# Patient Record
Sex: FEMALE | Race: WHITE | NOT HISPANIC OR LATINO | Employment: UNEMPLOYED | URBAN - METROPOLITAN AREA
[De-identification: names, ages, dates, MRNs, and addresses within clinical notes are randomized per-mention and may not be internally consistent; named-entity substitution may affect disease eponyms.]

---

## 2020-06-30 ENCOUNTER — NURSE TRIAGE (OUTPATIENT)
Dept: ADMINISTRATIVE | Facility: CLINIC | Age: 50
End: 2020-06-30

## 2022-01-21 ENCOUNTER — HOSPITAL ENCOUNTER (EMERGENCY)
Facility: HOSPITAL | Age: 52
Discharge: HOME | End: 2022-01-21
Attending: EMERGENCY MEDICINE
Payer: MEDICAID

## 2022-01-21 VITALS
RESPIRATION RATE: 16 BRPM | OXYGEN SATURATION: 100 % | HEART RATE: 77 BPM | WEIGHT: 168 LBS | TEMPERATURE: 98 F | HEIGHT: 65 IN | BODY MASS INDEX: 27.99 KG/M2 | SYSTOLIC BLOOD PRESSURE: 127 MMHG | DIASTOLIC BLOOD PRESSURE: 80 MMHG

## 2022-01-21 DIAGNOSIS — Z76.0 MEDICATION REFILL: Primary | ICD-10-CM

## 2022-01-21 DIAGNOSIS — R10.2 VAGINAL PAIN: ICD-10-CM

## 2022-01-21 DIAGNOSIS — N93.9 VAGINAL SPOTTING: ICD-10-CM

## 2022-01-21 LAB
ALBUMIN SERPL BCP-MCNC: 3.5 G/DL (ref 3.5–5.2)
ALP SERPL-CCNC: 82 U/L (ref 55–135)
ALT SERPL W/O P-5'-P-CCNC: 7 U/L (ref 10–44)
ANION GAP SERPL CALC-SCNC: 6 MMOL/L (ref 8–16)
AST SERPL-CCNC: 11 U/L (ref 10–40)
B-HCG UR QL: NEGATIVE
BACTERIA GENITAL QL WET PREP: ABNORMAL
BASOPHILS # BLD AUTO: 0.05 K/UL (ref 0–0.2)
BASOPHILS NFR BLD: 0.5 % (ref 0–1.9)
BILIRUB SERPL-MCNC: 0.4 MG/DL (ref 0.1–1)
BILIRUB UR QL STRIP: NEGATIVE
BUN SERPL-MCNC: 9 MG/DL (ref 6–20)
CALCIUM SERPL-MCNC: 8.8 MG/DL (ref 8.7–10.5)
CHLORIDE SERPL-SCNC: 104 MMOL/L (ref 95–110)
CLARITY UR: CLEAR
CLUE CELLS VAG QL WET PREP: ABNORMAL
CO2 SERPL-SCNC: 30 MMOL/L (ref 23–29)
COLOR UR: YELLOW
CREAT SERPL-MCNC: 0.9 MG/DL (ref 0.5–1.4)
CTP QC/QA: YES
DIFFERENTIAL METHOD: ABNORMAL
EOSINOPHIL # BLD AUTO: 0.1 K/UL (ref 0–0.5)
EOSINOPHIL NFR BLD: 1 % (ref 0–8)
ERYTHROCYTE [DISTWIDTH] IN BLOOD BY AUTOMATED COUNT: 12.7 % (ref 11.5–14.5)
EST. GFR  (AFRICAN AMERICAN): >60 ML/MIN/1.73 M^2
EST. GFR  (NON AFRICAN AMERICAN): >60 ML/MIN/1.73 M^2
FILAMENT FUNGI VAG WET PREP-#/AREA: ABNORMAL
GLUCOSE SERPL-MCNC: 91 MG/DL (ref 70–110)
GLUCOSE UR QL STRIP: NEGATIVE
HCT VFR BLD AUTO: 43.5 % (ref 37–48.5)
HGB BLD-MCNC: 14.3 G/DL (ref 12–16)
HGB UR QL STRIP: NEGATIVE
IMM GRANULOCYTES # BLD AUTO: 0.03 K/UL (ref 0–0.04)
IMM GRANULOCYTES NFR BLD AUTO: 0.3 % (ref 0–0.5)
KETONES UR QL STRIP: NEGATIVE
LEUKOCYTE ESTERASE UR QL STRIP: NEGATIVE
LYMPHOCYTES # BLD AUTO: 2.1 K/UL (ref 1–4.8)
LYMPHOCYTES NFR BLD: 22.3 % (ref 18–48)
MCH RBC QN AUTO: 32.2 PG (ref 27–31)
MCHC RBC AUTO-ENTMCNC: 32.9 G/DL (ref 32–36)
MCV RBC AUTO: 98 FL (ref 82–98)
MONOCYTES # BLD AUTO: 0.8 K/UL (ref 0.3–1)
MONOCYTES NFR BLD: 8.1 % (ref 4–15)
NEUTROPHILS # BLD AUTO: 6.4 K/UL (ref 1.8–7.7)
NEUTROPHILS NFR BLD: 67.8 % (ref 38–73)
NITRITE UR QL STRIP: NEGATIVE
NRBC BLD-RTO: 0 /100 WBC
PH UR STRIP: 8 [PH] (ref 5–8)
PLATELET # BLD AUTO: 311 K/UL (ref 150–450)
PMV BLD AUTO: 9.2 FL (ref 9.2–12.9)
POTASSIUM SERPL-SCNC: 4.3 MMOL/L (ref 3.5–5.1)
PROT SERPL-MCNC: 6.5 G/DL (ref 6–8.4)
PROT UR QL STRIP: NEGATIVE
RBC # BLD AUTO: 4.44 M/UL (ref 4–5.4)
SODIUM SERPL-SCNC: 140 MMOL/L (ref 136–145)
SP GR UR STRIP: 1.02 (ref 1–1.03)
SPECIMEN SOURCE: ABNORMAL
T VAGINALIS GENITAL QL WET PREP: ABNORMAL
TSH SERPL DL<=0.005 MIU/L-ACNC: 0.98 UIU/ML (ref 0.4–4)
URN SPEC COLLECT METH UR: NORMAL
UROBILINOGEN UR STRIP-ACNC: 1 EU/DL
WBC # BLD AUTO: 9.47 K/UL (ref 3.9–12.7)
WBC #/AREA VAG WET PREP: ABNORMAL
YEAST GENITAL QL WET PREP: ABNORMAL

## 2022-01-21 PROCEDURE — 87491 CHLMYD TRACH DNA AMP PROBE: CPT | Performed by: EMERGENCY MEDICINE

## 2022-01-21 PROCEDURE — 84443 ASSAY THYROID STIM HORMONE: CPT | Performed by: EMERGENCY MEDICINE

## 2022-01-21 PROCEDURE — 87210 SMEAR WET MOUNT SALINE/INK: CPT | Performed by: EMERGENCY MEDICINE

## 2022-01-21 PROCEDURE — 99284 EMERGENCY DEPT VISIT MOD MDM: CPT

## 2022-01-21 PROCEDURE — 36415 COLL VENOUS BLD VENIPUNCTURE: CPT | Performed by: EMERGENCY MEDICINE

## 2022-01-21 PROCEDURE — 81025 URINE PREGNANCY TEST: CPT | Performed by: EMERGENCY MEDICINE

## 2022-01-21 PROCEDURE — 80053 COMPREHEN METABOLIC PANEL: CPT | Performed by: EMERGENCY MEDICINE

## 2022-01-21 PROCEDURE — 25000003 PHARM REV CODE 250: Performed by: EMERGENCY MEDICINE

## 2022-01-21 PROCEDURE — 81003 URINALYSIS AUTO W/O SCOPE: CPT | Performed by: EMERGENCY MEDICINE

## 2022-01-21 PROCEDURE — 85025 COMPLETE CBC W/AUTO DIFF WBC: CPT | Performed by: EMERGENCY MEDICINE

## 2022-01-21 PROCEDURE — 87591 N.GONORRHOEAE DNA AMP PROB: CPT | Performed by: EMERGENCY MEDICINE

## 2022-01-21 RX ORDER — VENLAFAXINE 75 MG/1
75 TABLET ORAL DAILY
Qty: 30 TABLET | Refills: 0 | Status: SHIPPED | OUTPATIENT
Start: 2022-01-21

## 2022-01-21 RX ORDER — GABAPENTIN 800 MG/1
800 TABLET ORAL 2 TIMES DAILY
COMMUNITY
End: 2023-02-05

## 2022-01-21 RX ORDER — VENLAFAXINE 75 MG/1
75 TABLET ORAL 2 TIMES DAILY
COMMUNITY
End: 2022-01-21 | Stop reason: SDUPTHER

## 2022-01-21 RX ORDER — ARIPIPRAZOLE 5 MG/1
5 TABLET ORAL DAILY
COMMUNITY
End: 2022-01-21 | Stop reason: SDUPTHER

## 2022-01-21 RX ORDER — ARIPIPRAZOLE 5 MG/1
5 TABLET ORAL DAILY
Qty: 30 TABLET | Refills: 0 | Status: SHIPPED | OUTPATIENT
Start: 2022-01-21 | End: 2023-08-23 | Stop reason: SDUPTHER

## 2022-01-21 RX ADMIN — LIDOCAINE HYDROCHLORIDE: 20 SOLUTION ORAL; TOPICAL at 09:01

## 2022-01-22 NOTE — ED PROVIDER NOTES
Encounter Date: 2022       History     Chief Complaint   Patient presents with    Emesis     Sleeve done 1.5 years ago.     Vaginal Bleeding     Started 9 days ago. LMP approx 2021. Patient blood when wiping.      51-year-old female with a history of depression and anxiety presents to the ER with several complaints.  Patient is complaining of several weeks of vomiting once a day.  Sleeve gastroplasty done 1-1/2 years ago.  She is also complaining of some vaginal spotting.  She has not missed any menstrual cycles and is not pregnant.  She denies abdominal pain.  She denies diarrhea.  She is out of her FX or and Abilify for about a week.  She is requesting refills of these medications.  She has no chest pain she has no shortness of breath.  She has no dysuria.  Patient states she feels vaginal discomfort and thinks she has a vaginal lump.  She reports that she just does not feel right.  No drugs no alcohol.    The history is provided by the patient.     Review of patient's allergies indicates:  No Known Allergies  Past Medical History:   Diagnosis Date    Anxiety     Depression      Past Surgical History:   Procedure Laterality Date     SECTION      cyst on tailbone      SLEEVE GASTROPLASTY       History reviewed. No pertinent family history.  Social History     Tobacco Use    Smoking status: Current Every Day Smoker     Packs/day: 0.50     Years: 25.00     Pack years: 12.50    Smokeless tobacco: Never Used   Substance Use Topics    Alcohol use: Not Currently    Drug use: Never     Review of Systems   Constitutional: Positive for fatigue.   Gastrointestinal: Positive for nausea and vomiting. Negative for abdominal pain.   Genitourinary: Positive for vaginal bleeding and vaginal pain.   All other systems reviewed and are negative.      Physical Exam     Initial Vitals [22 1913]   BP Pulse Resp Temp SpO2   127/80 77 16 98.2 °F (36.8 °C) 100 %      MAP       --         Physical  Exam    Nursing note and vitals reviewed.  Constitutional: She appears well-developed and well-nourished. She is not diaphoretic. No distress.   HENT:   Head: Normocephalic and atraumatic.   Eyes: EOM are normal.   Neck: Neck supple.   Normal range of motion.  Cardiovascular: Normal rate, regular rhythm and normal heart sounds. Exam reveals no gallop and no friction rub.    No murmur heard.  Pulmonary/Chest: Breath sounds normal. No respiratory distress. She has no wheezes. She has no rhonchi. She has no rales.   Abdominal: Abdomen is soft. She exhibits no distension. There is no abdominal tenderness. There is no rebound.   Genitourinary:    Genitourinary Comments: Pelvic exam performed with [facility] nursing chaperone.  She has some yellow vaginal discharge.  Cervix appears unremarkable.  There is small area of prominent tender tissue in the vaginal floor.  No evidence of herpes.  Needs OBGYN follow-up.     Musculoskeletal:         General: Normal range of motion.      Cervical back: Normal range of motion and neck supple.     Neurological: She is alert and oriented to person, place, and time.   Skin: Skin is warm and dry.   Psychiatric: She has a normal mood and affect. Her behavior is normal. Judgment and thought content normal.         ED Course   Procedures  Labs Reviewed   CBC W/ AUTO DIFFERENTIAL - Abnormal; Notable for the following components:       Result Value    MCH 32.2 (*)     All other components within normal limits   COMPREHENSIVE METABOLIC PANEL - Abnormal; Notable for the following components:    CO2 30 (*)     ALT 7 (*)     Anion Gap 6 (*)     All other components within normal limits   VAGINAL SCREEN - Abnormal; Notable for the following components:    WBC - Vaginal Screen Few (*)     Bacteria - Vaginal Screen Occasional (*)     All other components within normal limits    Narrative:     Release to patient->Immediate   C. TRACHOMATIS/N. GONORRHOEAE BY AMP DNA   TSH   URINALYSIS, REFLEX TO  URINE CULTURE    Narrative:     Specimen Source->Urine   POCT URINE PREGNANCY          Imaging Results    None          Medications   (pyxis) gi cocktail (mylanta 30 mL, LIDOcaine 2 % viscous 10 mL, dicyclomine 10 mL) 50 mL ( Oral Given 1/21/22 2136)     Medical Decision Making:   Clinical Tests:   Lab Tests: Ordered and Reviewed             ED Course as of 01/21/22 2142 Fri Jan 21, 2022 1950 SpO2: 100 % [EF]   1951 Resp: 16 [EF]   1951 Pulse: 77 [EF]   1951 Temp src: Oral [EF]   1951 Temp: 98.2 °F (36.8 °C) [EF]   1951 BP: 127/80 [EF]   2030 Preg Test, Ur: Negative [EF]   2030 WBC: 9.47 [EF]   2030 Hemoglobin: 14.3 [EF]   2030 Platelets: 311 [EF]   2041 WBC - Vaginal Screen(!): Few [EF]   2041 Bacteria - Vaginal Screen(!): Occasional [EF]   2041 Trichomonas: None [EF]   2041 Clue Cells, Wet Prep: None [EF]   2041 Budding Yeast: None [EF]   2139 51-year-old female presents to the ER for malaise, intermittent vomiting for several weeks and vaginal pain with some spotting.  Pelvic exam demonstrates some discharge with small area of elevated irritated tissue on the floor of the vagina.  Patient is to follow-up with OBGYN for this.  ER workup is unremarkable.  No indication for abdominal CT.  No abdominal tenderness on exam.  Labs are unremarkable.  She needs a refill of her Abilify and venlafaxine which was provided. [EF]      ED Course User Index  [EF] Marcel Chavez MD             Clinical Impression:   Final diagnoses:  [Z76.0] Medication refill (Primary)  [N93.9] Vaginal spotting  [R10.2] Vaginal pain          ED Disposition Condition    Discharge Stable        ED Prescriptions     Medication Sig Dispense Start Date End Date Auth. Provider    ARIPiprazole (ABILIFY) 5 MG Tab Take 1 tablet (5 mg total) by mouth once daily. 30 tablet 1/21/2022 2/20/2022 Marcel Chavez MD    venlafaxine (EFFEXOR) 75 MG tablet Take 1 tablet (75 mg total) by mouth once daily. 30 tablet 1/21/2022  Marcel Chavez MD         Follow-up Information     Follow up With Specialties Details Why Contact Info    [facility] Emergency Dept Emergency Medicine  As needed [address] [place] [id number] [phone]    Ramez Guadalupe MD Obstetrics, Obstetrics and Gynecology Schedule an appointment as soon as possible for a visit   [address] [facility] [address] [facility] [address]  [phone]             Marcel Chavez MD  01/21/22 0345

## 2022-01-22 NOTE — ED NOTES
Patient to ED c/o vaginal bleeding x 9 days. Patient states her LMP was 12/18/21. Pt denies any trauma or the possibility of being pregnant. AAOx4, VSS

## 2022-01-24 LAB
C TRACH DNA SPEC QL NAA+PROBE: NOT DETECTED
N GONORRHOEA DNA SPEC QL NAA+PROBE: NOT DETECTED

## 2022-02-01 ENCOUNTER — TELEPHONE (OUTPATIENT)
Dept: SMOKING CESSATION | Facility: CLINIC | Age: 52
End: 2022-02-01
Payer: MEDICAID

## 2022-02-01 NOTE — TELEPHONE ENCOUNTER
Left message with my name and telephone number [phone] about missed appointment to start the tobacco cessation program.

## 2023-02-05 ENCOUNTER — HOSPITAL ENCOUNTER (EMERGENCY)
Facility: HOSPITAL | Age: 53
Discharge: HOME | End: 2023-02-05
Attending: EMERGENCY MEDICINE
Payer: MEDICAID

## 2023-02-05 VITALS
HEIGHT: 65 IN | BODY MASS INDEX: 28.32 KG/M2 | RESPIRATION RATE: 20 BRPM | HEART RATE: 89 BPM | WEIGHT: 170 LBS | SYSTOLIC BLOOD PRESSURE: 107 MMHG | DIASTOLIC BLOOD PRESSURE: 80 MMHG | TEMPERATURE: 98 F | OXYGEN SATURATION: 99 %

## 2023-02-05 DIAGNOSIS — Z76.0 MEDICATION REFILL: Primary | ICD-10-CM

## 2023-02-05 PROCEDURE — 99282 EMERGENCY DEPT VISIT SF MDM: CPT

## 2023-02-05 RX ORDER — GABAPENTIN 600 MG/1
600 TABLET ORAL 3 TIMES DAILY
Qty: 90 TABLET | Refills: 0 | Status: SHIPPED | OUTPATIENT
Start: 2023-02-05 | End: 2024-02-05

## 2023-02-05 NOTE — ED PROVIDER NOTES
Encounter Date: 2023       History     Chief Complaint   Patient presents with    Medication Refill     Pt wants a gabapentin medication refill.      Patient is a 53-year-old female with a past medical history of chronic anxiety and depression and restless legs syndrome who has been on gabapentin for years and just came back from [place] who presents the emergency room requesting a refill on her gabapentin.  Her last prescription was filled .  This was for 90 gabapentin 600 mg tablets.  She has no other complaints at this time.  She denies any weakness or numbness.    Review of patient's allergies indicates:  No Known Allergies  Past Medical History:   Diagnosis Date    Anxiety     Depression      Past Surgical History:   Procedure Laterality Date     SECTION      cyst on tailbone      SLEEVE GASTROPLASTY       No family history on file.  Social History     Tobacco Use    Smoking status: Every Day     Packs/day: 0.50     Years: 25.00     Pack years: 12.50     Types: Cigarettes    Smokeless tobacco: Never   Substance Use Topics    Alcohol use: Not Currently    Drug use: Never     Review of Systems   Constitutional:  Negative for fever.   HENT:  Negative for ear pain, rhinorrhea and sore throat.    Eyes:  Negative for pain and visual disturbance.   Respiratory:  Negative for cough and shortness of breath.    Cardiovascular:  Negative for chest pain.   Gastrointestinal:  Negative for abdominal pain, diarrhea, nausea and vomiting.   Genitourinary:  Negative for difficulty urinating.   Musculoskeletal:  Negative for arthralgias.   Skin:  Negative for rash.   Neurological:  Negative for weakness, numbness and headaches.        Neuropathy   All other systems reviewed and are negative.    Physical Exam     Initial Vitals [23 1408]   BP Pulse Resp Temp SpO2   107/80 89 20 98 °F (36.7 °C) 99 %      MAP       --         Physical Exam    Nursing note and vitals reviewed.  Constitutional: She  appears well-developed.  Non-toxic appearance.   HENT:   Head: Normocephalic and atraumatic.   Eyes: EOM are normal. Pupils are equal, round, and reactive to light.   Neck: Neck supple.   Cardiovascular:  Normal rate, regular rhythm, normal heart sounds and intact distal pulses.     Exam reveals no gallop and no friction rub.       No murmur heard.  Pulmonary/Chest: Breath sounds normal. No respiratory distress. She has no decreased breath sounds. She has no wheezes. She has no rhonchi. She has no rales.   Abdominal: Abdomen is soft. Bowel sounds are normal. She exhibits no distension. There is no abdominal tenderness.   Musculoskeletal:         General: No edema. Normal range of motion.      Cervical back: Neck supple.     Neurological: She is alert and oriented to person, place, and time. She has normal strength. No sensory deficit. GCS score is 15. GCS eye subscore is 4. GCS verbal subscore is 5. GCS motor subscore is 6.   Skin: Skin is warm and dry. No rash noted.   Psychiatric: She has a normal mood and affect.       ED Course   Procedures  Labs Reviewed - No data to display       Imaging Results    None          Medications - No data to display  Medical Decision Making:   Initial Assessment:   Medication refill  Differential Diagnosis:   Seems to be a simple medication refill.  I do not think she is seeking opiates.  She is not appear to be suicidal homicidal.  This is for her chronic peripheral neuropathy.  I reviewed the  which is consistent that she needs a refill.  Refill given follow up with primary care.  Stable for discharge                        Clinical Impression:   Final diagnoses:  [Z76.0] Medication refill (Primary)        ED Disposition Condition    Discharge Stable          ED Prescriptions       Medication Sig Dispense Start Date End Date Auth. Provider    gabapentin (NEURONTIN) 600 MG tablet Take 1 tablet (600 mg total) by mouth 3 (three) times daily. 90 tablet 2/5/2023 2/5/2024 Omi KLEIN  MD Jamil          Follow-up Information       Follow up With Specialties Details Why Contact Info    [facility]  Schedule an appointment as soon as possible for a visit  Call to schedule follow-up appointment to get medications refilled [facility] [place] [address]  [phone]          Call to schedule follow-up appoint with your regular doctor.  If you do not have a regular doctor call the clinic below             Omi Negron MD  02/05/23 3596

## 2023-03-28 ENCOUNTER — TELEPHONE (OUTPATIENT)
Dept: SURGERY | Facility: CLINIC | Age: 53
End: 2023-03-28
Payer: MEDICAID

## 2023-03-28 DIAGNOSIS — Z12.11 SCREENING FOR COLON CANCER: Primary | ICD-10-CM

## 2023-03-28 DIAGNOSIS — Z12.11 COLON CANCER SCREENING: Primary | ICD-10-CM

## 2023-03-28 RX ORDER — SODIUM CHLORIDE 0.9 % (FLUSH) 0.9 %
3 SYRINGE (ML) INJECTION
Status: CANCELLED | OUTPATIENT
Start: 2023-03-28

## 2023-03-28 NOTE — TELEPHONE ENCOUNTER
Called patient in reference to a referral to Colorectal Surgery for colon cancer screening. Patient verbally consented to a Colonoscopy and requested to be scheduled for a Colonoscopy on 04/03/2023 Patient was advised a designated  is required on the day of the Colonoscopy to drive the patient home and the  must be at least. 18 years old.Colonoscopy Prep instructions were thoroughly explained and discussed with the patient.It was emphasized, and reiterated to the patient, to please not to follow the bowel prep instructions that comes with the bowel prep package.However, to please follow the prep instructions that will be received in the mail,or via the [employer] portal, or by both modes of delivery, which ever method of delivery the patient prefers,from the [facility] L[facility]   Patient acknowledges understanding Prep instructions as explained and discussed on the phone.. Patient was advised the Colonoscopy Prep instructions discussed and explained on the phone,are being mailed out to the patient's verified address on file,or put onto the [employer] portal,or both methods of delivery, whichever the patient prefers. Patient's address on file was verified with the patient for accuracy of mailing. Patient's medications on file was reviewed with the patient for accuracy of information. Patient denies taking  any other medications other than those listed and verified on medication profile.Patient was explained the Colonoscopy will be performed here at [facility]. Patient was further explained the Pre-Op will call one day prior to the procedure date, to discuss Pre-Op instructions;and what time to report for the Colonoscopy. The patient was given the opportunity to ask any questions about the Colonoscopy. No further issues were discussed.

## 2023-03-28 NOTE — TELEPHONE ENCOUNTER
Called patient in reference to a referral to Colorectal Surgery for colon cancer screening. Patient verbally consented to a Colonoscopy and requested to be scheduled for a Colonoscopy on 04/03/2023 Patient was advised a designated  is required on the day of the Colonoscopy to drive the patient home and the  must be at least. 18 years old.Colonoscopy Prep instructions were thoroughly explained and discussed with the patient.It was emphasized, and reiterated to the patient, to please not to follow the bowel prep instructions that comes with the bowel prep package.However, to please follow the prep instructions that will be received in the mail,or via the [employer] portal, or by both modes of delivery, which ever method of delivery the patient prefers,from the [facility]   Patient acknowledges understanding Prep instructions as explained and discussed on the phone.. Patient was advised the Colonoscopy Prep instructions discussed and explained on the phone,are being mailed out to the patient's verified address on file,or put onto the [employer] portal,or both methods of delivery, whichever the patient prefers. Patient's address on file was verified with the patient for accuracy of mailing. Patient's medications on file was reviewed with the patient for accuracy of information. Patient denies taking  any other medications other than those listed and verified on medication profile.Patient was explained the Colonoscopy will be performed here at [facility]. Patient was further explained the Pre-Op will call one day prior to the procedure date, to discuss Pre-Op instructions;and what time to report for the Colonoscopy. The patient was given the opportunity to ask any questions about the Colonoscopy. No further issues were discussed.

## 2023-03-28 NOTE — TELEPHONE ENCOUNTER
The patient has been advised the Colonoscopy Prep Kit will be ordered from the patient's verified preferred pharmacy on file. The medication can  be picked up by the patient, or the patient's designated representative.The patient was further explained the Colonoscopy Prep instructions will be mailed to the patient verified mailing address on file, or put onto the [employer] portal, whichever method of delivery the patient prefers.Additionally this patient was informed,not to follow the instructions that comes with the bowel prep medication. However, the patient was instructed to please follow the Colonoscopy Bowel Prep instructions that's being provided by the . The patient was asked to please to follow the Colonoscopy Prep instructions being provided as precisely,and  meticulously as possible.The patient was advised you  will receive a follow up phone call to summarize the Colonoscopy Prep instructions prior to the scheduled Colonoscopy procedure date. At this time the patient will be given an opportunity to ask any questions regarding the Colonoscopy procedure, and it's associated Bowel Prep instructions.

## 2023-03-29 RX ORDER — SODIUM PICOSULFATE, MAGNESIUM OXIDE, AND ANHYDROUS CITRIC ACID 10; 3.5; 12 MG/160ML; G/160ML; G/160ML
160 LIQUID ORAL
Qty: 320 ML | Refills: 0 | Status: SHIPPED | OUTPATIENT
Start: 2023-03-29

## 2023-04-10 NOTE — DISCHARGE INSTRUCTIONS
See obgyn for vaginal pain please.  Follow-up with primary care for refills of your chronic medications.  Return to the ER for any concerns.   No

## 2023-04-18 ENCOUNTER — TELEPHONE (OUTPATIENT)
Dept: ENDOSCOPY | Facility: HOSPITAL | Age: 53
End: 2023-04-18
Payer: MEDICAID

## 2023-04-18 DIAGNOSIS — Z86.010 PERSONAL HISTORY OF COLONIC POLYPS: Primary | ICD-10-CM

## 2023-04-18 NOTE — TELEPHONE ENCOUNTER
Outside referral received from [facility]-Irene Barroso NP for a colonoscopy. See media tab for records and referral. Patient was previously scheduled at [place] and did not show for procedure.     Referral for Ambulatory referral/consult to endo  entered.

## 2023-04-19 DIAGNOSIS — Z86.010 HX OF COLONIC POLYPS: ICD-10-CM

## 2023-04-19 DIAGNOSIS — Z12.11 COLON CANCER SCREENING: Primary | ICD-10-CM

## 2023-05-10 ENCOUNTER — TELEPHONE (OUTPATIENT)
Dept: GASTROENTEROLOGY | Facility: CLINIC | Age: 53
End: 2023-05-10
Payer: MEDICAID

## 2023-05-10 NOTE — TELEPHONE ENCOUNTER
Ma called and left voicemail for patient to call back about information about her procedure on 5/11/23 and Ma sent message to the Endo schedulers as well to call patient

## 2023-05-10 NOTE — TELEPHONE ENCOUNTER
----- Message from Dilma Browne sent at 5/10/2023  2:14 PM CDT -----  Type:  Patient Returning Call    Who Called:PAPA ANDERSON [[id number]]  Who Left Message for Patient:Sotero  Does the patient know what this is regarding?:procedure 5/11/23  Would the patient rather a call back or a response via [facility]? Call back   Best Call Back Number:[phone]  Additional Information: Patient indicates she received a call from a nurse on staff and is returning the call. Patient indicates this is in regards to the procedure scheduled for 5/11/23. Patient indicates she would like a call back with further assistance and more information.

## 2024-03-18 NOTE — HIM RECORD RETIREMENT NOTE
nursing home of Incomplete Medical Record    3/18/24    Patient Name: Letty Rosas  Contact Serial # (CSN): [id number]  Patient Medical Record # (MRN): [id number]  Date of Service: Orders Only on 4/19/2023  Physician Name: Irene Barroso NP [[id number]     This record has been reviewed and is being retired as incomplete by the approval of the  Medical Staff Operating Committee (MSOC)     On 05/03/2023., due to:  Unavailability of Provider     Missing Information/Comments:  []    Discharge Summary   []    DC Note/Short Stay Summary   []    ED Provider Note   []    Delivery Note   []    History & Physical   []   Operative Note   []     Procedure Note   []     Physician Order   [x]     Verbal Order   []       Other, specify:

## 2024-03-20 PROBLEM — F31.9 BIPOLAR DISORDER WITHOUT PSYCHOTIC FEATURES: Status: ACTIVE | Noted: 2024-03-20

## 2024-03-20 PROBLEM — Z76.0 ENCOUNTER FOR MEDICATION REFILL: Status: ACTIVE | Noted: 2024-03-20

## 2024-06-19 PROBLEM — G25.81 RESTLESS LEG SYNDROME: Status: ACTIVE | Noted: 2024-06-19

## 2024-06-19 PROBLEM — Z76.0 MEDICATION REFILL: Status: ACTIVE | Noted: 2024-06-19

## 2024-07-15 ENCOUNTER — OFFICE VISIT (OUTPATIENT)
Facility: CLINIC | Age: 54
End: 2024-07-15
Payer: MEDICAID

## 2024-07-15 VITALS
HEART RATE: 91 BPM | OXYGEN SATURATION: 98 % | DIASTOLIC BLOOD PRESSURE: 78 MMHG | TEMPERATURE: 98 F | RESPIRATION RATE: 18 BRPM | WEIGHT: 142 LBS | HEIGHT: 65 IN | BODY MASS INDEX: 23.66 KG/M2 | SYSTOLIC BLOOD PRESSURE: 119 MMHG

## 2024-07-15 DIAGNOSIS — F17.210 SMOKING GREATER THAN 40 PACK YEARS: ICD-10-CM

## 2024-07-15 DIAGNOSIS — F31.81 BIPOLAR 2 DISORDER: ICD-10-CM

## 2024-07-15 DIAGNOSIS — T78.40XA ALLERGY, INITIAL ENCOUNTER: ICD-10-CM

## 2024-07-15 DIAGNOSIS — Z00.00 ANNUAL PHYSICAL EXAM: ICD-10-CM

## 2024-07-15 DIAGNOSIS — Z12.39 ENCOUNTER FOR SCREENING FOR MALIGNANT NEOPLASM OF BREAST, UNSPECIFIED SCREENING MODALITY: ICD-10-CM

## 2024-07-15 DIAGNOSIS — J44.9 CHRONIC OBSTRUCTIVE PULMONARY DISEASE, UNSPECIFIED COPD TYPE: ICD-10-CM

## 2024-07-15 DIAGNOSIS — G25.81 RESTLESS LEG SYNDROME: ICD-10-CM

## 2024-07-15 DIAGNOSIS — Z12.11 COLON CANCER SCREENING: ICD-10-CM

## 2024-07-15 DIAGNOSIS — Z76.0 MEDICATION REFILL: Primary | ICD-10-CM

## 2024-07-15 DIAGNOSIS — K21.9 GASTROESOPHAGEAL REFLUX DISEASE, UNSPECIFIED WHETHER ESOPHAGITIS PRESENT: ICD-10-CM

## 2024-07-15 DIAGNOSIS — K59.00 CONSTIPATION, UNSPECIFIED CONSTIPATION TYPE: ICD-10-CM

## 2024-07-15 PROBLEM — Z86.010 HISTORY OF COLON POLYPS: Status: ACTIVE | Noted: 2018-09-28

## 2024-07-15 PROBLEM — M25.511 CHRONIC RIGHT SHOULDER PAIN: Status: ACTIVE | Noted: 2018-09-07

## 2024-07-15 PROBLEM — G89.29 CHRONIC RIGHT SHOULDER PAIN: Status: ACTIVE | Noted: 2018-09-07

## 2024-07-15 PROBLEM — L03.90 CELLULITIS: Status: ACTIVE | Noted: 2024-04-09

## 2024-07-15 PROBLEM — F41.9 ANXIETY: Status: ACTIVE | Noted: 2022-06-02

## 2024-07-15 PROBLEM — Z86.0100 HISTORY OF COLON POLYPS: Status: ACTIVE | Noted: 2018-09-28

## 2024-07-15 PROBLEM — F41.8 MIXED ANXIETY DEPRESSIVE DISORDER: Status: ACTIVE | Noted: 2024-07-15

## 2024-07-15 PROCEDURE — 99213 OFFICE O/P EST LOW 20 MIN: CPT | Mod: S$PBB,25,,

## 2024-07-15 PROCEDURE — 3008F BODY MASS INDEX DOCD: CPT | Mod: CPTII,,,

## 2024-07-15 PROCEDURE — 3074F SYST BP LT 130 MM HG: CPT | Mod: CPTII,,,

## 2024-07-15 PROCEDURE — 99999 PR PBB SHADOW E&M-EST. PATIENT-LVL V: CPT | Mod: PBBFAC,,,

## 2024-07-15 PROCEDURE — 1159F MED LIST DOCD IN RCRD: CPT | Mod: CPTII,,,

## 2024-07-15 PROCEDURE — 99386 PREV VISIT NEW AGE 40-64: CPT | Mod: S$PBB,,,

## 2024-07-15 PROCEDURE — 1160F RVW MEDS BY RX/DR IN RCRD: CPT | Mod: CPTII,,,

## 2024-07-15 PROCEDURE — 99215 OFFICE O/P EST HI 40 MIN: CPT | Mod: PBBFAC,PN

## 2024-07-15 PROCEDURE — 3078F DIAST BP <80 MM HG: CPT | Mod: CPTII,,,

## 2024-07-15 RX ORDER — ARIPIPRAZOLE 2 MG/1
1 TABLET ORAL DAILY
COMMUNITY
End: 2024-07-15

## 2024-07-15 RX ORDER — LORATADINE 10 MG/1
10 TABLET ORAL DAILY
Qty: 30 TABLET | Refills: 2 | Status: SHIPPED | OUTPATIENT
Start: 2024-07-15 | End: 2024-10-13

## 2024-07-15 RX ORDER — LAMOTRIGINE 100 MG/1
100 TABLET ORAL
COMMUNITY
End: 2024-07-15

## 2024-07-15 RX ORDER — LAMOTRIGINE 100 MG/1
1 TABLET ORAL NIGHTLY
COMMUNITY
End: 2024-07-15

## 2024-07-15 RX ORDER — FLUTICASONE PROPIONATE 50 MCG
1 SPRAY, SUSPENSION (ML) NASAL DAILY
Qty: 18.2 ML | Refills: 0 | Status: SHIPPED | OUTPATIENT
Start: 2024-07-15

## 2024-07-15 RX ORDER — VENLAFAXINE HYDROCHLORIDE 75 MG/1
CAPSULE, EXTENDED RELEASE ORAL
COMMUNITY

## 2024-07-15 RX ORDER — SYRINGE-NEEDLE,INSULIN,0.5 ML 30 G X1/2"
SYRINGE, EMPTY DISPOSABLE MISCELLANEOUS
COMMUNITY
End: 2024-07-15

## 2024-07-15 RX ORDER — GABAPENTIN 600 MG/1
600 TABLET ORAL 2 TIMES DAILY
Qty: 60 TABLET | Refills: 0 | Status: SHIPPED | OUTPATIENT
Start: 2024-07-15 | End: 2024-07-15 | Stop reason: SDUPTHER

## 2024-07-15 RX ORDER — GABAPENTIN 600 MG/1
1 TABLET ORAL 2 TIMES DAILY
COMMUNITY
End: 2024-07-15 | Stop reason: SDUPTHER

## 2024-07-15 RX ORDER — SERTRALINE HYDROCHLORIDE 25 MG/1
25 TABLET, FILM COATED ORAL
COMMUNITY
End: 2024-07-15

## 2024-07-15 RX ORDER — LORATADINE 10 MG/1
1 TABLET ORAL DAILY
COMMUNITY
End: 2024-07-15 | Stop reason: SDUPTHER

## 2024-07-15 RX ORDER — AMOXICILLIN AND CLAVULANATE POTASSIUM 875; 125 MG/1; MG/1
1 TABLET, FILM COATED ORAL 2 TIMES DAILY
COMMUNITY
End: 2024-07-15

## 2024-07-15 RX ORDER — SERTRALINE HYDROCHLORIDE 50 MG/1
1 TABLET, FILM COATED ORAL DAILY
COMMUNITY
Start: 2023-03-23 | End: 2024-07-15

## 2024-07-15 RX ORDER — METRONIDAZOLE 500 MG/1
TABLET ORAL
COMMUNITY
End: 2024-07-15

## 2024-07-15 RX ORDER — FLUTICASONE PROPIONATE 50 MCG
1 SPRAY, SUSPENSION (ML) NASAL DAILY
COMMUNITY
End: 2024-07-15 | Stop reason: SDUPTHER

## 2024-07-15 RX ORDER — ONDANSETRON 4 MG/1
TABLET, ORALLY DISINTEGRATING ORAL
COMMUNITY
End: 2024-07-15

## 2024-07-15 RX ORDER — PANTOPRAZOLE SODIUM 40 MG/1
1 TABLET, DELAYED RELEASE ORAL DAILY
COMMUNITY
Start: 2023-03-23 | End: 2024-07-15 | Stop reason: SDUPTHER

## 2024-07-15 RX ORDER — DOXYCYCLINE 100 MG/1
1 CAPSULE ORAL 2 TIMES DAILY
COMMUNITY
End: 2024-07-15

## 2024-07-15 RX ORDER — ATOMOXETINE 40 MG/1
1 CAPSULE ORAL DAILY
COMMUNITY

## 2024-07-15 RX ORDER — PANTOPRAZOLE SODIUM 40 MG/1
40 TABLET, DELAYED RELEASE ORAL DAILY
Qty: 30 TABLET | Refills: 2 | Status: SHIPPED | OUTPATIENT
Start: 2024-07-15 | End: 2024-10-13

## 2024-07-15 RX ORDER — VALBENAZINE 40 MG/1
CAPSULE ORAL
COMMUNITY

## 2024-07-15 RX ORDER — ARIPIPRAZOLE 300 MG
KIT INTRAMUSCULAR
COMMUNITY
End: 2024-07-15

## 2024-07-15 RX ORDER — GABAPENTIN 300 MG/1
600 CAPSULE ORAL
COMMUNITY
Start: 2024-07-13 | End: 2024-07-15

## 2024-07-15 RX ORDER — NAPROXEN 500 MG/1
1 TABLET ORAL 2 TIMES DAILY
COMMUNITY
End: 2024-07-15

## 2024-07-15 RX ORDER — KETOROLAC TROMETHAMINE 10 MG/1
1 TABLET, FILM COATED ORAL EVERY 6 HOURS PRN
COMMUNITY
Start: 2023-12-04 | End: 2024-07-15

## 2024-07-15 RX ORDER — ATOMOXETINE 25 MG/1
1 CAPSULE ORAL EVERY MORNING
COMMUNITY
End: 2024-07-15

## 2024-07-15 RX ORDER — HYDROCORTISONE 25 MG/G
CREAM TOPICAL
COMMUNITY
End: 2024-07-15

## 2024-07-15 RX ORDER — MUPIROCIN 20 MG/G
OINTMENT TOPICAL 2 TIMES DAILY
COMMUNITY
Start: 2024-04-11

## 2024-07-15 RX ORDER — HYDROXYZINE PAMOATE 50 MG/1
CAPSULE ORAL
COMMUNITY
End: 2024-07-15

## 2024-07-15 RX ORDER — HYDROXYZINE HYDROCHLORIDE 25 MG/1
1 TABLET, FILM COATED ORAL 3 TIMES DAILY PRN
COMMUNITY
Start: 2023-03-23 | End: 2024-07-15

## 2024-07-15 RX ORDER — AMOXICILLIN 500 MG/1
CAPSULE ORAL
COMMUNITY
End: 2024-07-15

## 2024-07-15 RX ORDER — ONDANSETRON HYDROCHLORIDE 8 MG/1
1 TABLET, FILM COATED ORAL 2 TIMES DAILY PRN
COMMUNITY
Start: 2023-03-23

## 2024-07-15 RX ORDER — SULFAMETHOXAZOLE AND TRIMETHOPRIM 800; 160 MG/1; MG/1
TABLET ORAL
COMMUNITY
End: 2024-07-15

## 2024-07-15 RX ORDER — BUSPIRONE HYDROCHLORIDE 5 MG/1
1 TABLET ORAL 3 TIMES DAILY
COMMUNITY
End: 2024-07-15

## 2024-07-15 RX ORDER — ALBUTEROL SULFATE 90 UG/1
2 AEROSOL, METERED RESPIRATORY (INHALATION)
Qty: 18 G | Refills: 0 | Status: SHIPPED | OUTPATIENT
Start: 2024-07-15 | End: 2024-07-17 | Stop reason: SDUPTHER

## 2024-07-15 RX ORDER — LAMOTRIGINE 25 MG/1
1 TABLET ORAL 2 TIMES DAILY
COMMUNITY
End: 2024-07-15

## 2024-07-15 RX ORDER — ALBUTEROL SULFATE 90 UG/1
AEROSOL, METERED RESPIRATORY (INHALATION)
COMMUNITY
End: 2024-07-15 | Stop reason: SDUPTHER

## 2024-07-15 RX ORDER — CHLORHEXIDINE GLUCONATE ORAL RINSE 1.2 MG/ML
SOLUTION DENTAL
COMMUNITY
End: 2024-07-15

## 2024-07-15 RX ORDER — ARIPIPRAZOLE 400 MG
KIT INTRAMUSCULAR
COMMUNITY

## 2024-07-15 RX ORDER — ARIPIPRAZOLE 5 MG/1
1 TABLET ORAL DAILY
COMMUNITY
Start: 2023-03-23 | End: 2024-07-15

## 2024-07-15 RX ORDER — DICYCLOMINE HYDROCHLORIDE 10 MG/1
CAPSULE ORAL
COMMUNITY
End: 2024-07-15

## 2024-07-15 RX ORDER — ROPINIROLE HYDROCHLORIDE 6 MG/1
1 TABLET, FILM COATED, EXTENDED RELEASE ORAL DAILY
COMMUNITY
End: 2024-07-15

## 2024-07-15 RX ORDER — POLYETHYLENE GLYCOL 3350 17 G/17G
POWDER, FOR SOLUTION ORAL
COMMUNITY
End: 2024-07-15

## 2024-07-15 RX ORDER — POLYETHYLENE GLYCOL-3350 AND ELECTROLYTES WITH FLAVOR PACK 240; 5.84; 2.98; 6.72; 22.72 G/278.26G; G/278.26G; G/278.26G; G/278.26G; G/278.26G
POWDER, FOR SOLUTION ORAL
COMMUNITY
End: 2024-07-15

## 2024-07-15 RX ORDER — HYDROCODONE BITARTRATE AND ACETAMINOPHEN 5; 325 MG/1; MG/1
1 TABLET ORAL EVERY 6 HOURS PRN
COMMUNITY
End: 2024-07-15

## 2024-07-15 RX ORDER — TRAMADOL HYDROCHLORIDE 50 MG/1
1 TABLET ORAL
COMMUNITY
End: 2024-07-15

## 2024-07-15 NOTE — PROGRESS NOTES
SUBJECTIVE     Chief Complaint   Patient presents with    Establish Care    Medication Refill     Pt states she is coming for med refill and est care    Heartburn    restless leg       HPI  Letty Rosas is a 54 y.o. female with multiple medical diagnoses as listed in the medical history and problem list that presents for annual exam and establish care. Pt has been doing well over all she states. She has a good appetite and eats well. She does not exercise. She sleeps for ~8 hours nightly. Pt does not take OTC supplements. She does not have any current stressors. Pt is not UTD on age appropriate CA screening.    Pt on semaglutide injections weekly for weight loss and has been for the past 6 months. Not certain of dose katt due to medication being compounded. States she knows that she draws up 20 units on syringe but unsure of the ratio of medication Mg dose.     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   Tobacco Use    Smoking status: Every Day     Current packs/day: 0.50     Average packs/day: 0.5 packs/day for 25.0 years (12.5 ttl pk-yrs)     Types: Cigarettes     Passive exposure: Current    Smokeless tobacco: Never   Substance and Sexual Activity    Alcohol use: Not Currently     Comment: occassionally    Drug use: Never    Sexual activity: Yes     Partners: Male     Social Determinants of Health     Food Insecurity: No Food Insecurity (4/10/2024)    Received from [facility]    Hunger Vital Sign     Worried About  Running Out of Food in the Last Year: Never true     Ran Out of Food in the Last Year: Never true   Transportation Needs: No Transportation Needs (4/10/2024)    Received from [facility]     Lack of Transportation (Medical): No     Lack of Transportation (Non-Medical): No       FAMILY HISTORY:  Family History   Problem Relation Name Age of Onset    No Known Problems Mother      Lung cancer Father      No Known Problems Brother         ALLERGIES AND MEDICATIONS: updated and reviewed.  Review of patient's allergies indicates:  No Known Allergies  Current Outpatient Medications   Medication Sig Dispense Refill    ARIPiprazole (ABILIFY MAINTENA) 400 mg sers injection (pre-filled dual chamber)       atomoxetine (STRATTERA) 40 MG capsule Take 1 capsule by mouth once daily.      lamoTRIgine (LAMICTAL) 100 MG tablet Take 1 tablet (100 mg total) by mouth 2 (two) times daily. 180 tablet 1    STRATTERA 25 mg capsule Take 25 mg by mouth.      valbenazine (INGREZZA) 40 mg Cap       venlafaxine (EFFEXOR-XR) 75 MG 24 hr capsule       VISTARIL 25 mg Cap Take 25 mg by mouth 2 (two) times daily as needed.      albuterol (PROVENTIL/VENTOLIN HFA) 90 mcg/actuation inhaler Inhale 2 puffs into the lungs as needed for Wheezing. Rescue 18 g 0    fluticasone propionate (FLONASE) 50 mcg/actuation nasal spray 1 spray (50 mcg total) by Each Nostril route once daily. 18.2 mL 0    loratadine (CLARITIN) 10 mg tablet Take 1 tablet (10 mg total) by mouth once daily. 30 tablet 2    mupirocin (BACTROBAN) 2 % ointment Apply topically 2 (two) times daily. (Patient not taking: Reported on 7/15/2024)      ondansetron (ZOFRAN) 8 MG tablet Take 1 tablet by mouth 2 (two) times daily as needed. (Patient not taking: Reported on 7/15/2024)      pantoprazole (PROTONIX) 40 MG tablet Take 1 tablet (40 mg total) by mouth once daily. 30 tablet 2    semaglutide, weight loss, 0.5 mg/0.5 mL PnIj Inject 0.5 mg into the skin every 7 days.    "    No current facility-administered medications for this visit.       ROS  Review of Systems   Constitutional:  Negative for appetite change, chills, diaphoresis, fatigue, fever and unexpected weight change.   HENT:  Negative for congestion, ear pain, postnasal drip, rhinorrhea, sinus pressure, sinus pain and sore throat.    Eyes:  Negative for pain and redness.   Respiratory:  Negative for cough, choking, chest tightness and shortness of breath.    Cardiovascular:  Negative for chest pain, palpitations and leg swelling.   Gastrointestinal:  Negative for abdominal pain, constipation, diarrhea, nausea and vomiting.   Endocrine: Negative for cold intolerance and heat intolerance.   Genitourinary:  Negative for difficulty urinating, dysuria, flank pain and urgency.   Musculoskeletal:  Negative for back pain, neck pain and neck stiffness.   Skin:  Negative for color change, pallor and rash.   Allergic/Immunologic: Negative for food allergies.   Neurological:  Negative for dizziness, tremors, weakness, light-headedness and headaches.   Psychiatric/Behavioral:  Negative for agitation and confusion. The patient is not nervous/anxious.        OBJECTIVE     Physical Exam  Vitals:    07/15/24 0920   BP: 119/78   Pulse: 91   Resp: 18   Temp: 97.9 °F (36.6 °C)    Body mass index is 23.63 kg/m².  Weight: 64.4 kg (141 lb 15.6 oz)   Height: 5' 5" (165.1 cm)     Physical Exam  Constitutional:       Appearance: Normal appearance.   HENT:      Right Ear: Tympanic membrane normal. There is no impacted cerumen.      Left Ear: Tympanic membrane normal. There is no impacted cerumen.      Nose: Nose normal. No congestion or rhinorrhea.      Mouth/Throat:      Mouth: Mucous membranes are moist.      Pharynx: Oropharynx is clear.   Eyes:      General:         Right eye: No discharge.         Left eye: No discharge.      Conjunctiva/sclera: Conjunctivae normal.   Cardiovascular:      Rate and Rhythm: Normal rate and regular rhythm.      " Pulses: Normal pulses.      Heart sounds: Normal heart sounds.   Pulmonary:      Effort: Pulmonary effort is normal. No respiratory distress.      Breath sounds: Normal breath sounds. No wheezing.   Chest:      Chest wall: No tenderness.   Abdominal:      General: Bowel sounds are normal. There is no distension.      Palpations: Abdomen is soft.      Tenderness: There is no abdominal tenderness.   Musculoskeletal:         General: No swelling, tenderness or signs of injury. Normal range of motion.      Cervical back: Normal range of motion. No rigidity or tenderness.      Right lower leg: No edema.      Left lower leg: No edema.   Skin:     General: Skin is warm and dry.      Coloration: Skin is not pale.      Findings: No erythema, lesion or rash.   Neurological:      Mental Status: She is alert and oriented to person, place, and time.   Psychiatric:         Mood and Affect: Mood normal.         Behavior: Behavior normal.         Health Maintenance         Date Due Completion Date    Cervical Cancer Screening Never done ---    Lipid Panel Never done ---    HIV Screening Never done ---    Colorectal Cancer Screening Never done ---    Mammogram 08/29/2019 8/29/2018    COVID-19 Vaccine (1 - 2023-24 season) Never done ---    Shingles Vaccine (1 of 2) 08/02/2024 (Originally 1/24/2022) ---    Pneumococcal Vaccines (Age 0-64) (1 of 2 - PCV) 08/02/2024 (Originally [birth year]/24/1978) ---    Influenza Vaccine (1) 09/01/2024 ---    TETANUS VACCINE 06/12/2031 6/12/2021              ASSESSMENT     54 y.o. female with     1. Medication refill    2. Annual physical exam    3. Restless leg syndrome    4. Smoking greater than 40 pack years    5. Allergy, initial encounter    6. Constipation, unspecified constipation type    7. Gastroesophageal reflux disease, unspecified whether esophagitis present    8. Chronic obstructive pulmonary disease, unspecified COPD type    9. Colon cancer screening    10. Encounter for screening for malignant  neoplasm of breast, unspecified screening modality    11. Bipolar 2 disorder        PLAN:     1. Annual physical exam  Counseled on age appropriate medical preventative services, including age appropriate cancer screenings, over all nutritional health, need for a consistent exercise regimen and an over all push towards maintaining a vigorous and active lifestyle.  Counseled on age appropriate vaccines and discussed upcoming health care needs based on age/gender.  Spent time with patient counseling on need for a good patient/provider relationship moving forward.  Discussed use of common OTC medications and supplements.  Discussed common dietary aids and use of caffeine and the need for good sleep hygiene and stress management.  - CBC Auto Differential; Future  - Comprehensive Metabolic Panel; Future  - Hemoglobin A1C; Future  - Lipid Panel; Future  - TSH; Future  - HIV 1/2 Ag/Ab (4th Gen); Future    2. Medication refill  - Ambulatory referral/consult to Family Practice    3. Restless leg syndrome  New; Assessign   - Discussed with patient history and progression of symptoms   - Pt seen in ER 2 days ago and re-started on Gabapentin 300mg two times a day, pt previously on 600mg 3 times a day  - Instructed patient to continue treatment regimen as directed   - Follow up in 4 weeks  - Ambulatory referral/consult to Family Practice    4. Smoking greater than 40 pack years  New: Assessing  - Ambulatory referral/consult to Smoking Cessation Program; Future  - Ambulatory referral/consult to Lung Cancer Screening; Future    5. Allergy, initial encounter  New; Treating   - Discussed with patient symptoms and progression   - Instructed patient to began daily Rx therapy as directed   - Follow up in 4 weeks  - fluticasone propionate (FLONASE) 50 mcg/actuation nasal spray; 1 spray (50 mcg total) by Each Nostril route once daily.  Dispense: 18.2 mL; Refill: 0  - loratadine (CLARITIN) 10 mg tablet; Take 1 tablet (10 mg total) by  mouth once daily.  Dispense: 30 tablet; Refill: 2    6. Constipation, unspecified constipation type  New; Assessing   -discussed with patient side effects of current Semaglutide injections over the past 6 months and ways to help prevent constipation   - Encourage patient to ensure proper hydration throughout the day   -Follow up 4 weeks    7. Gastroesophageal reflux disease, unspecified whether esophagitis present  New; Stable  GERD - stable on PPI daily, as well as dietary restrictions  - pantoprazole (PROTONIX) 40 MG tablet; Take 1 tablet (40 mg total) by mouth once daily.  Dispense: 30 tablet; Refill: 2    8. Chronic obstructive pulmonary disease, unspecified COPD type  New; Stable   -Discussed with patient condition as well as PRN use of mediation as needed   - Pt states she has not had to use inhaler for some time but has been out and would like to have if necessary   - albuterol (PROVENTIL/VENTOLIN HFA) 90 mcg/actuation inhaler; Inhale 2 puffs into the lungs as needed for Wheezing. Rescue  Dispense: 18 g; Refill: 0    9. Colon cancer screening  New; Due  - Ambulatory referral/consult to Endo Procedure  **ONLY ORDER FOR [facility]**; Future    10. Encounter for screening for malignant neoplasm of breast, unspecified screening modality  New, Due  - Mammo Digital Screening Bilat w/ Matthias; Future    11. Bipolar 2 disorder  New; Assessing   - Pt reports that she was previously being seen but not for several years and needs to be seen and establish care  - Ambulatory referral/consult to Psychiatry; Future    RTC in 4 weeks    Jeff Washington DNP, APRN, FNP-BC  [facility]   07/15/2024 10:24 AM        Follow up in about 4 weeks (around 8/12/2024).

## 2024-07-16 ENCOUNTER — PATIENT MESSAGE (OUTPATIENT)
Facility: CLINIC | Age: 54
End: 2024-07-16
Payer: MEDICAID

## 2024-07-16 ENCOUNTER — TELEPHONE (OUTPATIENT)
Dept: HEMATOLOGY/ONCOLOGY | Facility: CLINIC | Age: 54
End: 2024-07-16
Payer: MEDICAID

## 2024-07-16 NOTE — TELEPHONE ENCOUNTER
Good morning    I rec'd a referral on the above patient for a smoking history of greater than 40 years.  A Jeff Washington DNP placed a referral for her to be seen in the high risk program.  Can you assit with scheduling this patient for an evaluation   Than michael Hunt

## 2024-07-17 DIAGNOSIS — J44.9 CHRONIC OBSTRUCTIVE PULMONARY DISEASE, UNSPECIFIED COPD TYPE: ICD-10-CM

## 2024-07-17 RX ORDER — ALBUTEROL SULFATE 90 UG/1
2 AEROSOL, METERED RESPIRATORY (INHALATION)
Qty: 18 G | Refills: 0 | Status: SHIPPED | OUTPATIENT
Start: 2024-07-17 | End: 2024-07-23 | Stop reason: SDUPTHER

## 2024-07-23 ENCOUNTER — TELEPHONE (OUTPATIENT)
Dept: SMOKING CESSATION | Facility: CLINIC | Age: 54
End: 2024-07-23
Payer: MEDICAID

## 2024-07-23 ENCOUNTER — CLINICAL SUPPORT (OUTPATIENT)
Dept: SMOKING CESSATION | Facility: CLINIC | Age: 54
End: 2024-07-23
Payer: COMMERCIAL

## 2024-07-23 DIAGNOSIS — F17.200 NICOTINE DEPENDENCE: Primary | ICD-10-CM

## 2024-07-23 DIAGNOSIS — J44.9 CHRONIC OBSTRUCTIVE PULMONARY DISEASE, UNSPECIFIED COPD TYPE: ICD-10-CM

## 2024-07-23 PROCEDURE — 99404 PREV MED CNSL INDIV APPRX 60: CPT | Mod: 95,,,

## 2024-07-23 RX ORDER — ALBUTEROL SULFATE 90 UG/1
2 AEROSOL, METERED RESPIRATORY (INHALATION)
Qty: 18 G | Refills: 0 | Status: SHIPPED | OUTPATIENT
Start: 2024-07-23 | End: 2024-10-21

## 2024-07-23 RX ORDER — IBUPROFEN 200 MG
1 TABLET ORAL DAILY
Qty: 28 PATCH | Refills: 0 | Status: SHIPPED | OUTPATIENT
Start: 2024-07-23

## 2024-07-23 RX ORDER — DM/P-EPHED/ACETAMINOPH/DOXYLAM 30-7.5/3
2 LIQUID (ML) ORAL
Qty: 144 LOZENGE | Refills: 0 | Status: SHIPPED | OUTPATIENT
Start: 2024-07-23

## 2024-07-23 NOTE — TELEPHONE ENCOUNTER
1st attempt, patient called for an appointment reminder of today's virtual visit.  Voicemail with contact information given.

## 2024-07-23 NOTE — PROGRESS NOTES
FTND score of 7 indicates a high dependence to Nicotine.  Patient states she is smoking 10 to 15 cpd.  PATRIA-D score of 24 is perceived as significant mental distress and probable depression. Patient states she is scheduled with a new mental health provider at this end of this month.  NRT ordered and instructions given for patches and lozenges.  Patient has a history with varenicline and it will be revisited next week.  Behavioral changes discussed.  Patient must smoke all cigarettes outside.

## 2024-07-29 ENCOUNTER — TELEPHONE (OUTPATIENT)
Facility: CLINIC | Age: 54
End: 2024-07-29
Payer: MEDICAID

## 2024-07-29 DIAGNOSIS — F31.81 BIPOLAR 2 DISORDER: Primary | ICD-10-CM

## 2024-07-29 RX ORDER — LAMOTRIGINE 100 MG/1
100 TABLET ORAL 2 TIMES DAILY
Qty: 60 TABLET | Refills: 0 | Status: SHIPPED | OUTPATIENT
Start: 2024-07-29 | End: 2024-08-28

## 2024-07-29 NOTE — TELEPHONE ENCOUNTER
----- Message from Analisa Mondragon MA sent at 7/29/2024  1:55 PM CDT -----  Contact: [phone]  Pt is equesting  lamoTRIgine (LAMICTAL) 100 MG tablet  ----- Message -----  From: Sandy Walden  Sent: 7/29/2024   1:18 PM CDT  To: Alfredito Rubio Staff    Requesting an RX refill or new RX.    Is this a refill or new RX: refill    RX name and strength (copy/paste from chart):  lamoTRIgine (LAMICTAL) 100 MG tablet    Is this a 30 day or 90 day RX:     Pharmacy name and phone # (copy/paste from chart):    [facility] [place], [place] - [address]  [address] [facility] [address]  Phone: [phone] Fax: [phone]      Please call pt and advise

## 2024-07-30 ENCOUNTER — TELEPHONE (OUTPATIENT)
Dept: SMOKING CESSATION | Facility: CLINIC | Age: 54
End: 2024-07-30
Payer: MEDICAID

## 2024-07-30 NOTE — TELEPHONE ENCOUNTER
2nd attempt, patient called after not checking in for her virtual visit.  No answer.  Voicemail box full.

## 2024-08-07 ENCOUNTER — TELEPHONE (OUTPATIENT)
Dept: SMOKING CESSATION | Facility: CLINIC | Age: 54
End: 2024-08-07
Payer: MEDICAID

## 2024-08-17 ENCOUNTER — NURSE TRIAGE (OUTPATIENT)
Dept: ADMINISTRATIVE | Facility: CLINIC | Age: 54
End: 2024-08-17
Payer: MEDICAID

## 2024-08-17 NOTE — TELEPHONE ENCOUNTER
Patient requesting a refill for gabapentin. Attempted to schedule an appointment for Monday but no appointments were available. Will send a message to her provider's office.  Advised the patient to call back with any further questions.      Reason for Disposition   [1] Caller has NON-URGENT medicine question about med that PCP prescribed AND [2] triager unable to answer question    Protocols used: Medication Question Call-A-

## 2024-08-19 RX ORDER — GABAPENTIN 600 MG/1
600 TABLET ORAL 2 TIMES DAILY
Qty: 30 TABLET | Refills: 0 | Status: SHIPPED | OUTPATIENT
Start: 2024-08-19

## 2024-08-19 RX ORDER — GABAPENTIN 600 MG/1
600 TABLET ORAL 2 TIMES DAILY
COMMUNITY
Start: 2024-07-15 | End: 2024-08-19 | Stop reason: SDUPTHER

## 2024-09-04 ENCOUNTER — CLINICAL SUPPORT (OUTPATIENT)
Dept: SMOKING CESSATION | Facility: CLINIC | Age: 54
End: 2024-09-04
Payer: COMMERCIAL

## 2024-09-04 DIAGNOSIS — F17.200 NICOTINE DEPENDENCE: Primary | ICD-10-CM

## 2024-09-04 PROCEDURE — 99407 BEHAV CHNG SMOKING > 10 MIN: CPT | Mod: S$GLB,,,

## 2024-09-04 PROCEDURE — 99999 PR PBB SHADOW E&M-EST. PATIENT-LVL I: CPT | Mod: PBBFAC,,,

## 2024-09-05 PROBLEM — F31.81 BIPOLAR 2 DISORDER: Status: ACTIVE | Noted: 2024-09-05

## 2024-09-15 ENCOUNTER — NURSE TRIAGE (OUTPATIENT)
Dept: ADMINISTRATIVE | Facility: CLINIC | Age: 54
End: 2024-09-15
Payer: MEDICAID

## 2024-09-15 ENCOUNTER — ON-DEMAND VIRTUAL (OUTPATIENT)
Dept: URGENT CARE | Facility: CLINIC | Age: 54
End: 2024-09-15
Payer: MEDICAID

## 2024-09-15 DIAGNOSIS — L02.91 ABSCESS: Primary | ICD-10-CM

## 2024-09-15 DIAGNOSIS — L08.9 SKIN INFECTION: ICD-10-CM

## 2024-09-15 PROCEDURE — 99213 OFFICE O/P EST LOW 20 MIN: CPT | Mod: 95,,, | Performed by: NURSE PRACTITIONER

## 2024-09-15 RX ORDER — CEPHALEXIN 500 MG/1
500 CAPSULE ORAL EVERY 12 HOURS
Qty: 14 CAPSULE | Refills: 0 | Status: SHIPPED | OUTPATIENT
Start: 2024-09-15 | End: 2024-09-22

## 2024-09-15 RX ORDER — MUPIROCIN 20 MG/G
OINTMENT TOPICAL 3 TIMES DAILY
Qty: 22 G | Refills: 0 | Status: SHIPPED | OUTPATIENT
Start: 2024-09-15

## 2024-09-15 NOTE — PROGRESS NOTES
Subjective:      Patient ID: Letty Rosas is a 54 y.o. female.    Vitals:  vitals were not taken for this visit.     Chief Complaint: Recurrent Skin Infections      Visit Type: TELE AUDIOVISUAL    Present with the patient at the time of consultation: TELEMED PRESENT WITH PATIENT: None        Past Medical History:   Diagnosis Date    Anxiety     Bipolar disorder     Depression     Restless leg syndrome      Past Surgical History:   Procedure Laterality Date     SECTION      cyst on tailbone      SLEEVE GASTROPLASTY       Review of patient's allergies indicates:  No Known Allergies  Current Outpatient Medications on File Prior to Visit   Medication Sig Dispense Refill    albuterol (PROVENTIL/VENTOLIN HFA) 90 mcg/actuation inhaler Inhale 2 puffs into the lungs as needed for Wheezing. Rescue. 18 g 0    ARIPiprazole (ABILIFY) 5 MG Tab Take 1 tablet (5 mg total) by mouth once daily. 30 tablet 1    atomoxetine (STRATTERA) 40 MG capsule Take 1 capsule by mouth once daily.      fluticasone propionate (FLONASE) 50 mcg/actuation nasal spray 1 spray (50 mcg total) by Each Nostril route once daily. 18.2 mL 0    gabapentin (NEURONTIN) 600 MG tablet Take 1 tablet (600 mg total) by mouth 2 (two) times daily. 30 tablet 0    lamoTRIgine (LAMICTAL) 100 MG tablet Take 1 tablet (100 mg total) by mouth 2 (two) times daily. 60 tablet 1    loratadine (CLARITIN) 10 mg tablet Take 1 tablet (10 mg total) by mouth once daily. 30 tablet 2    mupirocin (BACTROBAN) 2 % ointment Apply topically 2 (two) times daily. (Patient not taking: Reported on 7/15/2024)      nicotine (NICODERM CQ) 21 mg/24 hr Place 1 patch onto the skin once daily. (Patient not taking: Reported on 2024) 28 patch 0    nicotine polacrilex 2 MG Lozg Take 1 lozenge (2 mg total) by mouth as needed (use up to 10 lozenges a day for cravings.). (Patient not taking: Reported on 2024) 144 lozenge 0    ondansetron (ZOFRAN) 8 MG tablet Take 1 tablet by mouth 2 (two)  times daily as needed.      pantoprazole (PROTONIX) 40 MG tablet Take 1 tablet (40 mg total) by mouth once daily. 30 tablet 2    semaglutide, weight loss, 0.5 mg/0.5 mL PnIj Inject 0.5 mg into the skin every 7 days.      STRATTERA 25 mg capsule Take 25 mg by mouth.      valbenazine (INGREZZA) 40 mg Cap       venlafaxine (EFFEXOR-XR) 75 MG 24 hr capsule  (Patient not taking: Reported on 7/23/2024)      VISTARIL 25 mg Cap Take 25 mg by mouth 2 (two) times daily as needed.       No current facility-administered medications on file prior to visit.     Family History   Problem Relation Name Age of Onset    No Known Problems Mother      Lung cancer Father      No Known Problems Brother         Medications Ordered                [id number] [address] [address]    Telephone: [phone]   Fax: [phone]   Hours: Not open 24 hours                         E-Prescribed (2 of 2)              cephALEXin (KEFLEX) 500 MG capsule    Sig: Take 1 capsule (500 mg total) by mouth every 12 (twelve) hours. for 7 days       Start: 9/15/24     Quantity: 14 capsule Refills: 0                         mupirocin (BACTROBAN) 2 % ointment    Sig: Apply topically 3 (three) times daily.       Start: 9/15/24     Quantity: 22 g Refills: 0                           Ohs Peq Odvv Intake    9/15/2024  1:00 PM CDT - Filed by Patient   What is your current physical address in the event of a medical emergency? [address] [place] [address]   Are you able to take your vital signs? No   Please attach any relevant images or files          55 yo female with c/o bump in nose for two days and now on the outside of nose. She states she is having some pus drainage. She states she has been trying to dry it with a face mask. She denie sfever.         Constitution: Negative.   HENT: Negative.     Cardiovascular: Negative.    Respiratory: Negative.      Gastrointestinal: Negative.    Endocrine: negative.   Genitourinary: Negative.  Negative for frequency and urgency.   Musculoskeletal: Negative.    Skin: Negative.  Positive for erythema and abscess.   Allergic/Immunologic: Negative.    Neurological: Negative.    Hematologic/Lymphatic: Negative.    Psychiatric/Behavioral: Negative.          Objective:   The physical exam was conducted virtually.  LOCATION OF PATIENT [facility] [place]  AAO x 3 ; no acute distress noted; appearance normal; mood and behavior normal; thought process normal  Head- normocephalic  Nose- appears normal, no discharge or erythema  Eyes- pupils appear normal in size, no drainage, no erythema  Ears- normal appearing; no discharge, no erythema  Mouth- appears normal  Oropharynx- no erythema, lesions  Lungs- breathing at a normal rate, no acute distress noted  Heart- no reports of tachycardia, palpitations, chest pain  Abdomen- non distended, non tender reported by patient  Skin- warm and dry, no erythema or edema noted by patient or visualized  Psych- as above; no si/hi      Assessment:     1. Abscess    2. Skin infection        Plan:       Abscess  -     cephALEXin (KEFLEX) 500 MG capsule; Take 1 capsule (500 mg total) by mouth every 12 (twelve) hours. for 7 days  Dispense: 14 capsule; Refill: 0  -     mupirocin (BACTROBAN) 2 % ointment; Apply topically 3 (three) times daily.  Dispense: 22 g; Refill: 0    Skin infection  -     cephALEXin (KEFLEX) 500 MG capsule; Take 1 capsule (500 mg total) by mouth every 12 (twelve) hours. for 7 days  Dispense: 14 capsule; Refill: 0  -     mupirocin (BACTROBAN) 2 % ointment; Apply topically 3 (three) times daily.  Dispense: 22 g; Refill: 0

## 2024-09-15 NOTE — TELEPHONE ENCOUNTER
Patient has questions about completing a virtual visit. All questions and concerns were addressed. Advised the patient to call back with any further questions or if symptoms worsen.        Reason for Disposition   General information question, no triage required and triager able to answer question    Protocols used: Information Only Call - No Triage-A-

## 2024-09-17 ENCOUNTER — TELEPHONE (OUTPATIENT)
Dept: SMOKING CESSATION | Facility: CLINIC | Age: 54
End: 2024-09-17
Payer: MEDICAID

## 2024-09-17 NOTE — TELEPHONE ENCOUNTER
1st attempt, patient called after not checking in for her rescheduled virtual visit.  Voicemail with contact information given.

## 2024-09-19 ENCOUNTER — ON-DEMAND VIRTUAL (OUTPATIENT)
Dept: URGENT CARE | Facility: CLINIC | Age: 54
End: 2024-09-19
Payer: MEDICAID

## 2024-09-19 DIAGNOSIS — L02.91 ABSCESS: Primary | ICD-10-CM

## 2024-09-19 PROCEDURE — 99499 UNLISTED E&M SERVICE: CPT | Mod: 95,,, | Performed by: NURSE PRACTITIONER

## 2024-09-19 NOTE — PROGRESS NOTES
Subjective:      Patient ID: Letty Rosas is a 54 y.o. female.    Vitals:  vitals were not taken for this visit.     Chief Complaint: Abscess      Visit Type: TELE AUDIOVISUAL - This visit was conducted virtually based on  subjective information and limited objective exam    Present with the patient at the time of consultation: TELEMED PRESENT WITH PATIENT: None        Past Medical History:   Diagnosis Date    Anxiety     Bipolar disorder     Depression     Restless leg syndrome      Past Surgical History:   Procedure Laterality Date     SECTION      cyst on tailbone      SLEEVE GASTROPLASTY       Review of patient's allergies indicates:  No Known Allergies  Current Outpatient Medications on File Prior to Visit   Medication Sig Dispense Refill    albuterol (PROVENTIL/VENTOLIN HFA) 90 mcg/actuation inhaler Inhale 2 puffs into the lungs as needed for Wheezing. Rescue. 18 g 0    ARIPiprazole (ABILIFY) 5 MG Tab Take 1 tablet (5 mg total) by mouth once daily. 30 tablet 1    atomoxetine (STRATTERA) 40 MG capsule Take 1 capsule by mouth once daily.      cephALEXin (KEFLEX) 500 MG capsule Take 1 capsule (500 mg total) by mouth every 12 (twelve) hours. for 7 days 14 capsule 0    fluticasone propionate (FLONASE) 50 mcg/actuation nasal spray 1 spray (50 mcg total) by Each Nostril route once daily. 18.2 mL 0    gabapentin (NEURONTIN) 600 MG tablet Take 1 tablet (600 mg total) by mouth 2 (two) times daily. 30 tablet 0    lamoTRIgine (LAMICTAL) 100 MG tablet Take 1 tablet (100 mg total) by mouth 2 (two) times daily. 60 tablet 1    loratadine (CLARITIN) 10 mg tablet Take 1 tablet (10 mg total) by mouth once daily. 30 tablet 2    mupirocin (BACTROBAN) 2 % ointment Apply topically 2 (two) times daily. (Patient not taking: Reported on 7/15/2024)      mupirocin (BACTROBAN) 2 % ointment Apply topically 3 (three) times daily. 22 g 0    nicotine (NICODERM CQ) 21 mg/24 hr Place 1 patch onto the skin once daily. (Patient not  taking: Reported on 9/4/2024) 28 patch 0    nicotine polacrilex 2 MG Lozg Take 1 lozenge (2 mg total) by mouth as needed (use up to 10 lozenges a day for cravings.). (Patient not taking: Reported on 9/4/2024) 144 lozenge 0    ondansetron (ZOFRAN) 8 MG tablet Take 1 tablet by mouth 2 (two) times daily as needed.      pantoprazole (PROTONIX) 40 MG tablet Take 1 tablet (40 mg total) by mouth once daily. 30 tablet 2    semaglutide, weight loss, 0.5 mg/0.5 mL PnIj Inject 0.5 mg into the skin every 7 days.      STRATTERA 25 mg capsule Take 25 mg by mouth.      valbenazine (INGREZZA) 40 mg Cap       venlafaxine (EFFEXOR-XR) 75 MG 24 hr capsule  (Patient not taking: Reported on 7/23/2024)      VISTARIL 25 mg Cap Take 25 mg by mouth 2 (two) times daily as needed.       No current facility-administered medications on file prior to visit.     Family History   Problem Relation Name Age of Onset    No Known Problems Mother      Lung cancer Father      No Known Problems Brother             Ohs Peq Odvv Intake    9/19/2024  3:31 PM CDT - Filed by Patient   What is your current physical address in the event of a medical emergency? [address] [place] [address]   Are you able to take your vital signs? No   Please attach any relevant images or files          53 yo female seen by me several days ago for nasal infection. She states the wound inside nose appears to be worsening even on antibiotics        Constitution: Negative.   HENT: Negative.     Cardiovascular: Negative.    Respiratory: Negative.     Gastrointestinal: Negative.    Endocrine: negative.   Genitourinary: Negative.  Negative for frequency and urgency.   Musculoskeletal: Negative.    Skin: Negative.  Positive for abscess.   Allergic/Immunologic: Negative.    Neurological: Negative.    Hematologic/Lymphatic: Negative.    Psychiatric/Behavioral: Negative.          Objective:   The physical exam was conducted virtually.  LOCATION OF PATIENT [facility] x 3 ; no acute  distress noted; appearance normal; mood and behavior normal; thought process normal  Head- normocephalic  Nose- appears normal, no discharge or erythema  Eyes- pupils appear normal in size, no drainage, no erythema  Ears- normal appearing; no discharge, no erythema  Mouth- appears normal  Oropharynx- no erythema, lesions  Lungs- breathing at a normal rate, no acute distress noted  Heart- no reports of tachycardia, palpitations, chest pain  Abdomen- non distended, non tender reported by patient  Skin- warm and dry, no erythema or edema noted by patient or visualized  Psych- as above; no si/hi      Assessment:     1. Abscess        Plan:   Patient advised to go to urgent care for in person exam       Thank you for choosing [facility]!    Our goal in the [facility] is to always provide outstanding medical care. You may receive a survey by mail or e-mail in the next week regarding your experience today. We would greatly appreciate you completing and returning the survey. Your feedback provides us with a way to recognize our staff who provide very good care, and it helps us learn how to improve when your experience was below our aspiration of excellence.         We appreciate you trusting us with your medical care. We hope you feel better soon. We will be happy to take care of you for all of your future medical needs.    You must understand that you've received an Urgent Care treatment only and that you may be released before all your medical problems are known or treated. You, the patient, will arrange for follow up care as instructed.    Follow up with your PCP or specialty clinic as directed in the next 1-2 weeks if not improved or as needed.  You can call [phone] to schedule an appointment with the appropriate provider.    If your condition worsens we recommend that you receive another evaluation in person, with your primary care provider, urgent care or at the emergency  room immediately or contact your primary medical clinics after hours call service to discuss your concerns.         Abscess

## 2024-10-03 ENCOUNTER — TELEPHONE (OUTPATIENT)
Dept: SMOKING CESSATION | Facility: CLINIC | Age: 54
End: 2024-10-03
Payer: MEDICAID

## 2024-10-04 ENCOUNTER — OFFICE VISIT (OUTPATIENT)
Facility: CLINIC | Age: 54
End: 2024-10-04
Payer: MEDICAID

## 2024-10-04 DIAGNOSIS — F31.81 BIPOLAR 2 DISORDER: ICD-10-CM

## 2024-10-04 DIAGNOSIS — T78.40XA ALLERGY, INITIAL ENCOUNTER: ICD-10-CM

## 2024-10-04 DIAGNOSIS — G25.81 RESTLESS LEG SYNDROME: Primary | ICD-10-CM

## 2024-10-04 PROCEDURE — 1159F MED LIST DOCD IN RCRD: CPT | Mod: CPTII,95,,

## 2024-10-04 PROCEDURE — 1160F RVW MEDS BY RX/DR IN RCRD: CPT | Mod: CPTII,95,,

## 2024-10-04 PROCEDURE — 99213 OFFICE O/P EST LOW 20 MIN: CPT | Mod: 95,,,

## 2024-10-05 RX ORDER — LAMOTRIGINE 100 MG/1
100 TABLET ORAL 2 TIMES DAILY
Qty: 60 TABLET | Refills: 1 | Status: SHIPPED | OUTPATIENT
Start: 2024-10-05 | End: 2024-12-04

## 2024-10-05 RX ORDER — GABAPENTIN 600 MG/1
600 TABLET ORAL 2 TIMES DAILY PRN
Qty: 30 TABLET | Refills: 0 | Status: SHIPPED | OUTPATIENT
Start: 2024-10-05 | End: 2024-11-04

## 2024-10-05 RX ORDER — LORATADINE 10 MG/1
10 TABLET ORAL DAILY
Qty: 30 TABLET | Refills: 2 | Status: SHIPPED | OUTPATIENT
Start: 2024-10-05 | End: 2025-01-03

## 2024-10-05 RX ORDER — FLUTICASONE PROPIONATE 50 MCG
1 SPRAY, SUSPENSION (ML) NASAL DAILY
Qty: 18.2 ML | Refills: 0 | Status: SHIPPED | OUTPATIENT
Start: 2024-10-05

## 2024-10-05 NOTE — PROGRESS NOTES
The patient's location is:  Patient's Home   The chief complaint leading to consultation is:  Restless leg syndrome [G25.81]    Visit type: audiovisual    Time spent in discussion with the patient: 15  40 minutes of total time spent on the encounter. This includes time spent in discussion with the patient and time preparing for the patient appointment: review of tests, obtaining and/or reviewing separately obtained history, documenting clinical information in the electronic or other health record, independently interpreting results (not separately reported), and communicating results to the patient/family/caregiver or care coordination (not separately reported).     Each patient to whom he or she provides medical services by telemedicine is: (1) informed of the relationship between the physician and patient and the respective role of any other health care provider with respect to management of the patient; and (2) notified that he or she may decline to receive medical services by telemedicine and may withdraw from such care at any time.      Subjective:   Letty Rosas is a 54 y.o. female who presents for Restless leg syndrome [G25.81].       HPI   Pt arrived for telemed appointment today with concerns of ongoing restless leg and pain. Pt states she has been doing ok but has been out of her Rx therapy and has had an increase in leg pain.      Pt states she has been eating pretty good but not sleeping well due to restless legs     Pt also states her mood has been fluctuating but states she has been taking her medications as directed and has been compliant. Pt states she would like to be seen for therapy if possible. Pt states she has not had any recent therapy.     Pt denies any recent illness or hospitalizations since her last visit.   Review of Systems   Constitutional:  Negative for activity change and unexpected weight change.   HENT:  Negative for hearing loss, rhinorrhea and trouble swallowing.    Eyes:   Negative for discharge and visual disturbance.   Respiratory:  Negative for chest tightness and wheezing.    Cardiovascular:  Negative for chest pain and palpitations.   Gastrointestinal:  Negative for blood in stool, constipation, diarrhea and vomiting.   Endocrine: Negative for polydipsia and polyuria.   Genitourinary:  Negative for difficulty urinating, dysuria, hematuria and menstrual problem.   Musculoskeletal:  Negative for arthralgias, joint swelling and neck pain.        Restless leg   Neurological:  Negative for weakness and headaches.   Psychiatric/Behavioral:  Positive for dysphoric mood. Negative for confusion.          Current Outpatient Medications   Medication Instructions    albuterol (PROVENTIL/VENTOLIN HFA) 90 mcg/actuation inhaler 2 puffs, Inhalation, As needed (PRN), Rescue.    ARIPiprazole (ABILIFY) 5 mg, Oral, Daily    atomoxetine (STRATTERA) 40 MG capsule 1 capsule, Oral, Daily    fluticasone propionate (FLONASE) 50 mcg, Each Nostril, Daily    gabapentin (NEURONTIN) 600 mg, Oral, 2 times daily PRN    lamoTRIgine (LAMICTAL) 100 mg, Oral, 2 times daily    loratadine (CLARITIN) 10 mg, Oral, Daily    mupirocin (BACTROBAN) 2 % ointment Topical (Top), 3 times daily    ondansetron (ZOFRAN) 8 MG tablet 1 tablet, Oral, 2 times daily PRN    pantoprazole (PROTONIX) 40 mg, Oral, Daily    semaglutide (weight loss) 0.5 mg, Subcutaneous, Every 7 days    STRATTERA 25 mg, Oral    valbenazine (INGREZZA) 40 mg Cap     venlafaxine (EFFEXOR-XR) 75 MG 24 hr capsule     VistariL 25 mg, Oral, 2 times daily PRN       Objective:   No home vitals reported.     Physical Exam      Assessment/Plan:        Diagnoses and all orders for this visit:    Restless leg syndrome  Uncontrolled; Treating   - pt had been doing pretty well but has had a recent increase in symptoms and pain of bilateral legs   - reviewed with patient current symptoms and treatments   - instructed patient to take Rx therapy as directed as needed   -  Follow up in 8 weeks or sooner if symptoms worsen or fail to improve  -     gabapentin (NEURONTIN) 600 MG tablet; Take 1 tablet (600 mg total) by mouth 2 (two) times daily as needed (leg pain and restless leg).    Bipolar 2 disorder  Stable; treating   - discussed with patient history and current symptoms   - discussed with patient the additional support and treatment options with specialist   - pt has not seen specialist for therapy   - will refill Rx therapy and place referral for evaluation and additional therapy   - instructed patient to continue current Rx therapy as directed  - follow up in 8 weeks or sooner if needed  -     lamoTRIgine (LAMICTAL) 100 MG tablet; Take 1 tablet (100 mg total) by mouth 2 (two) times daily.  -     Ambulatory referral/consult to Psychiatry; Future    Allergy, initial encounter  Stable; treating   - symptoms have been limited since starting Rx therapy   - instructed patient to continue current Rx therapy   - follow up in 8 weeks or sooner if needed  -     loratadine (CLARITIN) 10 mg tablet; Take 1 tablet (10 mg total) by mouth once daily.  -     fluticasone propionate (FLONASE) 50 mcg/actuation nasal spray; 1 spray (50 mcg total) by Each Nostril route once daily.    Jeff Washington DNP, FNP-BC

## 2024-10-16 DIAGNOSIS — G25.81 RESTLESS LEG SYNDROME: Primary | ICD-10-CM

## 2024-10-17 ENCOUNTER — CLINICAL SUPPORT (OUTPATIENT)
Dept: ENDOSCOPY | Facility: HOSPITAL | Age: 54
End: 2024-10-17
Payer: MEDICAID

## 2024-10-17 ENCOUNTER — TELEPHONE (OUTPATIENT)
Dept: ENDOSCOPY | Facility: HOSPITAL | Age: 54
End: 2024-10-17

## 2024-10-17 DIAGNOSIS — Z12.11 COLON CANCER SCREENING: ICD-10-CM

## 2024-10-17 NOTE — PLAN OF CARE
Contacted the patient to schedule an endoscopy procedure(s) Colonoscopy. The patient did not answer the call and left a voice message requesting a call back.     
Implemented All Fall with Harm Risk Interventions:  [place] to call system. Call bell, personal items and telephone within reach. Instruct patient to call for assistance. Room bathroom lighting operational. Non-slip footwear when patient is off stretcher. Physically safe environment: no spills, clutter or unnecessary equipment. Stretcher in lowest position, wheels locked, appropriate side rails in place. Provide visual cue, wrist band, yellow gown, etc. Monitor gait and stability. Monitor for mental status changes and reorient to person, place, and time. Review medications for side effects contributing to fall risk. Reinforce activity limits and safety measures with patient and family. Provide visual clues: red socks.

## 2024-10-30 ENCOUNTER — CLINICAL SUPPORT (OUTPATIENT)
Dept: SMOKING CESSATION | Facility: CLINIC | Age: 54
End: 2024-10-30
Payer: COMMERCIAL

## 2024-10-30 DIAGNOSIS — F17.200 NICOTINE DEPENDENCE: Primary | ICD-10-CM

## 2024-10-30 DIAGNOSIS — G25.81 RESTLESS LEG SYNDROME: ICD-10-CM

## 2024-10-30 DIAGNOSIS — J44.9 CHRONIC OBSTRUCTIVE PULMONARY DISEASE, UNSPECIFIED COPD TYPE: ICD-10-CM

## 2024-10-30 PROCEDURE — 99407 BEHAV CHNG SMOKING > 10 MIN: CPT | Mod: S$GLB,,,

## 2024-10-31 RX ORDER — GABAPENTIN 600 MG/1
600 TABLET ORAL 2 TIMES DAILY PRN
Qty: 30 TABLET | Refills: 0 | Status: SHIPPED | OUTPATIENT
Start: 2024-10-31 | End: 2024-11-30

## 2024-10-31 RX ORDER — ALBUTEROL SULFATE 90 UG/1
2 INHALANT RESPIRATORY (INHALATION)
Qty: 18 G | Refills: 0 | Status: SHIPPED | OUTPATIENT
Start: 2024-10-31 | End: 2025-01-29

## 2024-11-13 ENCOUNTER — TELEPHONE (OUTPATIENT)
Dept: PRIMARY CARE CLINIC | Facility: CLINIC | Age: 54
End: 2024-11-13
Payer: MEDICAID

## 2024-11-13 NOTE — TELEPHONE ENCOUNTER
Called pt to advise her that the frequency of her medication was verified and ready for  at her preferred pharmacy on file. Pt did not answer the phone. A voicemail was left.

## 2024-12-12 ENCOUNTER — ON-DEMAND VIRTUAL (OUTPATIENT)
Dept: URGENT CARE | Facility: CLINIC | Age: 54
End: 2024-12-12
Payer: MEDICAID

## 2024-12-12 DIAGNOSIS — F31.9 BIPOLAR AFFECTIVE DISORDER, REMISSION STATUS UNSPECIFIED: Primary | ICD-10-CM

## 2024-12-12 DIAGNOSIS — F31.81 BIPOLAR 2 DISORDER: ICD-10-CM

## 2024-12-12 RX ORDER — ARIPIPRAZOLE 5 MG/1
5 TABLET ORAL DAILY
Qty: 30 TABLET | Refills: 11 | Status: SHIPPED | OUTPATIENT
Start: 2024-12-12 | End: 2025-12-12

## 2024-12-12 RX ORDER — LAMOTRIGINE 100 MG/1
100 TABLET ORAL 2 TIMES DAILY
Qty: 60 TABLET | Refills: 1 | Status: SHIPPED | OUTPATIENT
Start: 2024-12-12 | End: 2025-02-10

## 2024-12-12 NOTE — PATIENT INSTRUCTIONS
Refilled your medications until you can make it to your usual doctor.    Thank you for choosing [facility]!    Our goal in the [facility] to always provide outstanding medical care. You may receive a survey by mail or e-mail in the next week regarding your experience today. We would greatly appreciate you completing and returning the survey. Your feedback provides us with a way to recognize our staff who provide very good care, and it helps us learn how to improve when your experience was below our aspiration of excellence.         We appreciate you trusting us with your medical care. We hope you feel better soon. We will be happy to take care of you for all of your future medical needs.    You must understand that you've received Virtual  treatment only and that you may be released before all your medical problems are known or treated. You, the patient, will arrange for follow up care as instructed.    Follow up with your PCP or specialty clinic as directed in the next 1-2 weeks if not improved or as needed.  You can call [phone] to schedule an appointment with the appropriate provider.    If your condition worsens we recommend that you receive another evaluation in person, with your primary care provider, urgent care or at the emergency room immediately or contact your primary medical clinics after hours call service to discuss your concerns.

## 2024-12-12 NOTE — PROGRESS NOTES
Subjective:      Patient ID: Letty Rosas is a 54 y.o. female.    Vitals:  vitals were not taken for this visit.     Chief Complaint: Medication Refill      Visit Type: TELE AUDIOVISUAL    Present with the patient at the time of consultation: TELEMED PRESENT WITH PATIENT: None at home    Past Medical History:   Diagnosis Date    Anxiety     Bipolar disorder     Depression     Restless leg syndrome      Past Surgical History:   Procedure Laterality Date     SECTION      cyst on tailbone      SLEEVE GASTROPLASTY       Review of patient's allergies indicates:  No Known Allergies  Current Outpatient Medications on File Prior to Visit   Medication Sig Dispense Refill    albuterol (PROVENTIL/VENTOLIN HFA) 90 mcg/actuation inhaler Inhale 2 puffs into the lungs as needed for Wheezing. Rescue. 18 g 0    atomoxetine (STRATTERA) 40 MG capsule Take 1 capsule by mouth once daily.      fluticasone propionate (FLONASE) 50 mcg/actuation nasal spray 1 spray (50 mcg total) by Each Nostril route once daily. 18.2 mL 0    gabapentin (NEURONTIN) 600 MG tablet Take 1 tablet (600 mg total) by mouth 2 (two) times daily as needed (leg pain and restless leg). 30 tablet 0    loratadine (CLARITIN) 10 mg tablet Take 1 tablet (10 mg total) by mouth once daily. 30 tablet 2    mupirocin (BACTROBAN) 2 % ointment Apply topically 3 (three) times daily. 22 g 0    ondansetron (ZOFRAN) 8 MG tablet Take 1 tablet by mouth 2 (two) times daily as needed.      pantoprazole (PROTONIX) 40 MG tablet Take 1 tablet (40 mg total) by mouth once daily. 30 tablet 2    semaglutide, weight loss, 0.5 mg/0.5 mL PnIj Inject 0.5 mg into the skin every 7 days.      STRATTERA 25 mg capsule Take 25 mg by mouth.      valbenazine (INGREZZA) 40 mg Cap       venlafaxine (EFFEXOR-XR) 75 MG 24 hr capsule  (Patient not taking: Reported on 2024)      VISTARIL 25 mg Cap Take 25 mg by mouth 2 (two) times daily as needed.      [DISCONTINUED] lamoTRIgine (LAMICTAL) 100 MG  tablet Take 1 tablet (100 mg total) by mouth 2 (two) times daily. 60 tablet 1     No current facility-administered medications on file prior to visit.     Family History   Problem Relation Name Age of Onset    No Known Problems Mother      Lung cancer Father      No Known Problems Brother         Medications Ordered                [id number] - [address] AT [facility] [address] [place]   [address] [facility] [id number]    Telephone: [phone]   Fax: [phone]   Hours: Not open 24 hours                         E-Prescribed (2 of 2)              ARIPiprazole (ABILIFY) 5 MG Tab    Sig: Take 1 tablet (5 mg total) by mouth once daily.       Start: 12/12/24     Quantity: 30 tablet Refills: 11                         lamoTRIgine (LAMICTAL) 100 MG tablet    Sig: Take 1 tablet (100 mg total) by mouth 2 (two) times daily.       Start: 12/12/24     Quantity: 60 tablet Refills: 1                           Ohs Peq Odvv Intake    12/12/2024  9:38 AM CST - Filed by Patient   What is your current physical address in the event of a medical emergency? [address] [place] [address]   Are you able to take your vital signs? No   Please attach any relevant images or files    Is your employer contracted with [facility]? No         Patient with a hx of bipolar had made a virtual visit with her usual doc but it was canceled. This is her second day without Lamictal and Abilify and she states she is feeling depressed. Would like refill until she can see her usual doc again. Her last visit was in October.     Medication Refill        Constitution: Negative for activity change.   Neurological:  Negative for disorientation and altered mental status.   Psychiatric/Behavioral:  Positive for nervous/anxious, history of mental illness and depression. Negative for altered mental status, disorientation, confusion, agitation, hallucinations, homicidal ideas and suicidal ideas.  The patient is nervous/anxious.         Objective:   The physical exam was conducted virtually.  Physical Exam   Abdominal: Normal appearance.   Neurological: She is alert.   Psychiatric: Her behavior is normal. Her mood appears anxious. Her speech is not rapid and/or pressured, not tangential and not slurred. She exhibits a depressed mood.       Assessment:     1. Bipolar affective disorder, remission status unspecified    2. Bipolar 2 disorder        Plan:       Bipolar affective disorder, remission status unspecified    Bipolar 2 disorder  -     lamoTRIgine (LAMICTAL) 100 MG tablet; Take 1 tablet (100 mg total) by mouth 2 (two) times daily.  Dispense: 60 tablet; Refill: 1    Other orders  -     ARIPiprazole (ABILIFY) 5 MG Tab; Take 1 tablet (5 mg total) by mouth once daily.  Dispense: 30 tablet; Refill: 11

## 2024-12-15 ENCOUNTER — PATIENT MESSAGE (OUTPATIENT)
Dept: URGENT CARE | Facility: CLINIC | Age: 54
End: 2024-12-15
Payer: MEDICAID

## 2025-01-09 ENCOUNTER — PATIENT MESSAGE (OUTPATIENT)
Facility: CLINIC | Age: 55
End: 2025-01-09
Payer: MEDICAID

## 2025-01-15 ENCOUNTER — TELEPHONE (OUTPATIENT)
Dept: SMOKING CESSATION | Facility: CLINIC | Age: 55
End: 2025-01-15
Payer: MEDICAID

## 2025-01-15 ENCOUNTER — HOSPITAL ENCOUNTER (EMERGENCY)
Facility: HOSPITAL | Age: 55
Discharge: HOME | End: 2025-01-15
Attending: EMERGENCY MEDICINE
Payer: MEDICAID

## 2025-01-15 VITALS
SYSTOLIC BLOOD PRESSURE: 119 MMHG | OXYGEN SATURATION: 100 % | BODY MASS INDEX: 23.32 KG/M2 | HEIGHT: 65 IN | WEIGHT: 140 LBS | HEART RATE: 71 BPM | DIASTOLIC BLOOD PRESSURE: 72 MMHG | RESPIRATION RATE: 20 BRPM

## 2025-01-15 DIAGNOSIS — Z76.0 ENCOUNTER FOR MEDICATION REFILL: Primary | ICD-10-CM

## 2025-01-15 DIAGNOSIS — F31.81 BIPOLAR 2 DISORDER: ICD-10-CM

## 2025-01-15 PROCEDURE — 99283 EMERGENCY DEPT VISIT LOW MDM: CPT

## 2025-01-15 PROCEDURE — 25000003 PHARM REV CODE 250: Performed by: PHYSICIAN ASSISTANT

## 2025-01-15 RX ORDER — LAMOTRIGINE 100 MG/1
100 TABLET ORAL 2 TIMES DAILY
Qty: 60 TABLET | Refills: 0 | Status: SHIPPED | OUTPATIENT
Start: 2025-01-15 | End: 2025-02-14

## 2025-01-15 RX ORDER — ARIPIPRAZOLE 5 MG/1
5 TABLET ORAL DAILY
Qty: 30 TABLET | Refills: 0 | Status: SHIPPED | OUTPATIENT
Start: 2025-01-15 | End: 2025-01-15

## 2025-01-15 RX ORDER — ARIPIPRAZOLE 5 MG/1
5 TABLET ORAL DAILY
Qty: 30 TABLET | Refills: 0 | Status: SHIPPED | OUTPATIENT
Start: 2025-01-15 | End: 2025-02-14

## 2025-01-15 RX ORDER — ARIPIPRAZOLE 5 MG/1
5 TABLET ORAL DAILY
Status: DISCONTINUED | OUTPATIENT
Start: 2025-01-15 | End: 2025-01-15 | Stop reason: HOSPADM

## 2025-01-15 RX ORDER — LAMOTRIGINE 100 MG/1
100 TABLET ORAL ONCE
Status: COMPLETED | OUTPATIENT
Start: 2025-01-15 | End: 2025-01-15

## 2025-01-15 RX ADMIN — LAMOTRIGINE 100 MG: 100 TABLET ORAL at 09:01

## 2025-01-15 RX ADMIN — ARIPIPRAZOLE 5 MG: 5 TABLET ORAL at 10:01

## 2025-01-16 NOTE — ED PROVIDER NOTES
Encounter Date: 1/15/2025       History     Chief Complaint   Patient presents with    Medication Refill     Is currently out of her lamictal and ambilify states she has an appointment next Tuesday but could not get in any sooner      Letty Rosas is a 54 y.o. female presenting for medication refills of her Lamictal and Abilify.  She states her PCP usually prescribes these medications for her, but she ran out and was not able to take her doses yesterday.  She has a past medical history of Anxiety, Bipolar disorder, Depression, and Restless leg syndrome.      The history is provided by the patient.     Review of patient's allergies indicates:  No Known Allergies  Past Medical History:   Diagnosis Date    Anxiety     Bipolar disorder     Depression     Restless leg syndrome      Past Surgical History:   Procedure Laterality Date     SECTION      cyst on tailbone      SLEEVE GASTROPLASTY       Family History   Problem Relation Name Age of Onset    No Known Problems Mother      Lung cancer Father      No Known Problems Brother       Social History     Tobacco Use    Smoking status: Every Day     Current packs/day: 0.50     Average packs/day: 0.5 packs/day for 25.0 years (12.5 ttl pk-yrs)     Types: Cigarettes     Passive exposure: Current    Smokeless tobacco: Never   Substance Use Topics    Alcohol use: Not Currently     Comment: occassionally    Drug use: Never     Review of Systems   Constitutional:  Negative for activity change, appetite change, fatigue and fever.   HENT:  Negative for congestion, rhinorrhea and sore throat.    Eyes:  Negative for visual disturbance.   Respiratory:  Negative for cough, chest tightness, shortness of breath and wheezing.    Cardiovascular:  Negative for chest pain and palpitations.   Gastrointestinal:  Negative for diarrhea, nausea and vomiting.   Musculoskeletal:  Negative for arthralgias and myalgias.   Skin:  Negative for rash.   Neurological:  Negative for weakness,  light-headedness, numbness and headaches.       Physical Exam     Initial Vitals [01/15/25 0921]   BP Pulse Resp Temp SpO2   119/72 71 20 -- 100 %      MAP       --         Physical Exam    Nursing note and vitals reviewed.  Constitutional: She appears well-developed and well-nourished. She is not diaphoretic. No distress.   HENT:   Head: Normocephalic and atraumatic.   Nose: Nose normal. Mouth/Throat: Oropharynx is clear and moist.   Eyes: Conjunctivae are normal.   Neck: Neck supple.   Normal range of motion.  Cardiovascular:  Normal rate, regular rhythm, normal heart sounds and intact distal pulses.           Pulmonary/Chest: Breath sounds normal. No respiratory distress. She has no wheezes. She has no rhonchi. She has no rales.   Musculoskeletal:         General: No tenderness or edema. Normal range of motion.      Cervical back: Normal range of motion and neck supple.     Neurological: She is alert and oriented to person, place, and time. She has normal strength. No sensory deficit.   Skin: Skin is warm and dry. No rash and no abscess noted. No erythema.   Psychiatric: She has a normal mood and affect.         ED Course   Procedures  Labs Reviewed - No data to display       Imaging Results    None          Medications   lamoTRIgine tablet 100 mg (100 mg Oral Given 1/15/25 0954)     Medical Decision Making  Patient is well-appearing on exam.  Vitals are stable.  Prescription refill for Lamictal and Abilify are sent to the pharmacy.  She is highly encouraged to get any new refills from her primary care provider.  She voices understanding and is agreeable with the plan.  She is given specific return precautions.    Risk  Prescription drug management.                                      Clinical Impression:  Final diagnoses:  [Z76.0] Encounter for medication refill (Primary)  [F31.81] Bipolar 2 disorder          ED Disposition Condition    Discharge Stable          ED Prescriptions       Medication Sig Dispense  Start Date End Date Auth. Provider    ARIPiprazole (ABILIFY) 5 MG Tab  (Status: Discontinued) Take 1 tablet (5 mg total) by mouth once daily. 30 tablet 1/15/2025 1/15/2025 Niurka Lamas PA-C    lamoTRIgine (LAMICTAL) 100 MG tablet Take 1 tablet (100 mg total) by mouth 2 (two) times daily. 60 tablet 1/15/2025 2/14/2025 Niurka Lamas PA-C    ARIPiprazole (ABILIFY) 5 MG Tab Take 1 tablet (5 mg total) by mouth once daily. 30 tablet 1/15/2025 2/14/2025 Niurka Lamas PA-C          Follow-up Information       Follow up With Specialties Details Why Contact Info Additional Information    [facility]  As needed, If symptoms worsen [address] [place] [address] [facility] [phone] 1st floor    Primary Care Provider   for further refills               Niurka Lamas PA-C  01/15/25 2059

## 2025-01-27 ENCOUNTER — OFFICE VISIT (OUTPATIENT)
Facility: CLINIC | Age: 55
End: 2025-01-27
Payer: MEDICAID

## 2025-01-27 DIAGNOSIS — R41.3 MEMORY CHANGE: ICD-10-CM

## 2025-01-27 DIAGNOSIS — J30.89 ENVIRONMENTAL AND SEASONAL ALLERGIES: Primary | ICD-10-CM

## 2025-01-27 RX ORDER — FLUTICASONE PROPIONATE 50 MCG
1 SPRAY, SUSPENSION (ML) NASAL DAILY
Qty: 11.1 ML | Refills: 1 | Status: SHIPPED | OUTPATIENT
Start: 2025-01-27

## 2025-01-27 RX ORDER — CETIRIZINE HYDROCHLORIDE 10 MG/1
10 TABLET ORAL DAILY
Qty: 30 TABLET | Refills: 1 | Status: SHIPPED | OUTPATIENT
Start: 2025-01-27 | End: 2025-03-28

## 2025-01-31 ENCOUNTER — PATIENT OUTREACH (OUTPATIENT)
Dept: ADMINISTRATIVE | Facility: HOSPITAL | Age: 55
End: 2025-01-31
Payer: MEDICAID

## 2025-01-31 ENCOUNTER — OFFICE VISIT (OUTPATIENT)
Facility: CLINIC | Age: 55
End: 2025-01-31
Payer: MEDICAID

## 2025-01-31 ENCOUNTER — LAB VISIT (OUTPATIENT)
Dept: LAB | Facility: HOSPITAL | Age: 55
End: 2025-01-31
Attending: STUDENT IN AN ORGANIZED HEALTH CARE EDUCATION/TRAINING PROGRAM
Payer: MEDICAID

## 2025-01-31 VITALS
RESPIRATION RATE: 18 BRPM | OXYGEN SATURATION: 96 % | HEART RATE: 78 BPM | TEMPERATURE: 98 F | HEIGHT: 65 IN | BODY MASS INDEX: 22.57 KG/M2 | SYSTOLIC BLOOD PRESSURE: 110 MMHG | DIASTOLIC BLOOD PRESSURE: 92 MMHG | WEIGHT: 135.5 LBS

## 2025-01-31 DIAGNOSIS — Z87.891 PERSONAL HISTORY OF NICOTINE DEPENDENCE: ICD-10-CM

## 2025-01-31 DIAGNOSIS — R41.3 MEMORY LOSS: Primary | ICD-10-CM

## 2025-01-31 DIAGNOSIS — Z12.31 ENCOUNTER FOR SCREENING MAMMOGRAM FOR MALIGNANT NEOPLASM OF BREAST: ICD-10-CM

## 2025-01-31 DIAGNOSIS — R41.3 MEMORY LOSS: ICD-10-CM

## 2025-01-31 DIAGNOSIS — F31.81 BIPOLAR 2 DISORDER: ICD-10-CM

## 2025-01-31 DIAGNOSIS — K21.9 GASTROESOPHAGEAL REFLUX DISEASE, UNSPECIFIED WHETHER ESOPHAGITIS PRESENT: ICD-10-CM

## 2025-01-31 DIAGNOSIS — Z12.11 SCREENING FOR MALIGNANT NEOPLASM OF COLON: ICD-10-CM

## 2025-01-31 DIAGNOSIS — G47.30 SLEEP APNEA, UNSPECIFIED TYPE: ICD-10-CM

## 2025-01-31 DIAGNOSIS — G89.29 CHRONIC LEFT SHOULDER PAIN: ICD-10-CM

## 2025-01-31 DIAGNOSIS — M25.512 CHRONIC LEFT SHOULDER PAIN: ICD-10-CM

## 2025-01-31 LAB
ALBUMIN SERPL BCP-MCNC: 3.9 G/DL (ref 3.5–5.2)
ALP SERPL-CCNC: 77 U/L (ref 40–150)
ALT SERPL W/O P-5'-P-CCNC: 10 U/L (ref 10–44)
ANION GAP SERPL CALC-SCNC: 8 MMOL/L (ref 8–16)
AST SERPL-CCNC: 14 U/L (ref 10–40)
BASOPHILS # BLD AUTO: 0.05 K/UL (ref 0–0.2)
BASOPHILS NFR BLD: 0.7 % (ref 0–1.9)
BILIRUB SERPL-MCNC: 0.9 MG/DL (ref 0.1–1)
BUN SERPL-MCNC: 12 MG/DL (ref 6–20)
CALCIUM SERPL-MCNC: 9.3 MG/DL (ref 8.7–10.5)
CHLORIDE SERPL-SCNC: 104 MMOL/L (ref 95–110)
CHOLEST SERPL-MCNC: 183 MG/DL (ref 120–199)
CHOLEST/HDLC SERPL: 3.1 {RATIO} (ref 2–5)
CO2 SERPL-SCNC: 25 MMOL/L (ref 23–29)
CREAT SERPL-MCNC: 1 MG/DL (ref 0.5–1.4)
DIFFERENTIAL METHOD BLD: ABNORMAL
EOSINOPHIL # BLD AUTO: 0.1 K/UL (ref 0–0.5)
EOSINOPHIL NFR BLD: 1 % (ref 0–8)
ERYTHROCYTE [DISTWIDTH] IN BLOOD BY AUTOMATED COUNT: 12.3 % (ref 11.5–14.5)
EST. GFR  (NO RACE VARIABLE): >60 ML/MIN/1.73 M^2
FERRITIN SERPL-MCNC: 72 NG/ML (ref 20–300)
GLUCOSE SERPL-MCNC: 74 MG/DL (ref 70–110)
HCT VFR BLD AUTO: 44.7 % (ref 37–48.5)
HDLC SERPL-MCNC: 60 MG/DL (ref 40–75)
HDLC SERPL: 32.8 % (ref 20–50)
HGB BLD-MCNC: 14.7 G/DL (ref 12–16)
IMM GRANULOCYTES # BLD AUTO: 0.01 K/UL (ref 0–0.04)
IMM GRANULOCYTES NFR BLD AUTO: 0.1 % (ref 0–0.5)
IRON SERPL-MCNC: 83 UG/DL (ref 30–160)
LDLC SERPL CALC-MCNC: 107.6 MG/DL (ref 63–159)
LYMPHOCYTES # BLD AUTO: 2 K/UL (ref 1–4.8)
LYMPHOCYTES NFR BLD: 27.7 % (ref 18–48)
MAGNESIUM SERPL-MCNC: 2 MG/DL (ref 1.6–2.6)
MCH RBC QN AUTO: 32.2 PG (ref 27–31)
MCHC RBC AUTO-ENTMCNC: 32.9 G/DL (ref 32–36)
MCV RBC AUTO: 98 FL (ref 82–98)
MONOCYTES # BLD AUTO: 0.7 K/UL (ref 0.3–1)
MONOCYTES NFR BLD: 9.2 % (ref 4–15)
NEUTROPHILS # BLD AUTO: 4.3 K/UL (ref 1.8–7.7)
NEUTROPHILS NFR BLD: 61.3 % (ref 38–73)
NONHDLC SERPL-MCNC: 123 MG/DL
NRBC BLD-RTO: 0 /100 WBC
PLATELET # BLD AUTO: 270 K/UL (ref 150–450)
PMV BLD AUTO: 9.4 FL (ref 9.2–12.9)
POTASSIUM SERPL-SCNC: 4.5 MMOL/L (ref 3.5–5.1)
PROT SERPL-MCNC: 7.2 G/DL (ref 6–8.4)
RBC # BLD AUTO: 4.57 M/UL (ref 4–5.4)
SATURATED IRON: 22 % (ref 20–50)
SODIUM SERPL-SCNC: 137 MMOL/L (ref 136–145)
TOTAL IRON BINDING CAPACITY: 377 UG/DL (ref 250–450)
TRANSFERRIN SERPL-MCNC: 255 MG/DL (ref 200–375)
TRIGL SERPL-MCNC: 77 MG/DL (ref 30–150)
TSH SERPL DL<=0.005 MIU/L-ACNC: 0.84 UIU/ML (ref 0.4–4)
WBC # BLD AUTO: 7.07 K/UL (ref 3.9–12.7)

## 2025-01-31 PROCEDURE — 99999 PR PBB SHADOW E&M-EST. PATIENT-LVL V: CPT | Mod: PBBFAC,,, | Performed by: STUDENT IN AN ORGANIZED HEALTH CARE EDUCATION/TRAINING PROGRAM

## 2025-01-31 PROCEDURE — 83735 ASSAY OF MAGNESIUM: CPT | Performed by: STUDENT IN AN ORGANIZED HEALTH CARE EDUCATION/TRAINING PROGRAM

## 2025-01-31 PROCEDURE — 3080F DIAST BP >= 90 MM HG: CPT | Mod: CPTII,,, | Performed by: STUDENT IN AN ORGANIZED HEALTH CARE EDUCATION/TRAINING PROGRAM

## 2025-01-31 PROCEDURE — 99215 OFFICE O/P EST HI 40 MIN: CPT | Mod: PBBFAC,PN,25 | Performed by: STUDENT IN AN ORGANIZED HEALTH CARE EDUCATION/TRAINING PROGRAM

## 2025-01-31 PROCEDURE — 83921 ORGANIC ACID SINGLE QUANT: CPT | Performed by: STUDENT IN AN ORGANIZED HEALTH CARE EDUCATION/TRAINING PROGRAM

## 2025-01-31 PROCEDURE — 1159F MED LIST DOCD IN RCRD: CPT | Mod: CPTII,,, | Performed by: STUDENT IN AN ORGANIZED HEALTH CARE EDUCATION/TRAINING PROGRAM

## 2025-01-31 PROCEDURE — 80053 COMPREHEN METABOLIC PANEL: CPT | Performed by: STUDENT IN AN ORGANIZED HEALTH CARE EDUCATION/TRAINING PROGRAM

## 2025-01-31 PROCEDURE — 3008F BODY MASS INDEX DOCD: CPT | Mod: CPTII,,, | Performed by: STUDENT IN AN ORGANIZED HEALTH CARE EDUCATION/TRAINING PROGRAM

## 2025-01-31 PROCEDURE — 82746 ASSAY OF FOLIC ACID SERUM: CPT | Performed by: STUDENT IN AN ORGANIZED HEALTH CARE EDUCATION/TRAINING PROGRAM

## 2025-01-31 PROCEDURE — 84443 ASSAY THYROID STIM HORMONE: CPT | Performed by: STUDENT IN AN ORGANIZED HEALTH CARE EDUCATION/TRAINING PROGRAM

## 2025-01-31 PROCEDURE — 82607 VITAMIN B-12: CPT | Performed by: STUDENT IN AN ORGANIZED HEALTH CARE EDUCATION/TRAINING PROGRAM

## 2025-01-31 PROCEDURE — 80061 LIPID PANEL: CPT | Performed by: STUDENT IN AN ORGANIZED HEALTH CARE EDUCATION/TRAINING PROGRAM

## 2025-01-31 PROCEDURE — 85025 COMPLETE CBC W/AUTO DIFF WBC: CPT | Performed by: STUDENT IN AN ORGANIZED HEALTH CARE EDUCATION/TRAINING PROGRAM

## 2025-01-31 PROCEDURE — 36415 COLL VENOUS BLD VENIPUNCTURE: CPT | Performed by: STUDENT IN AN ORGANIZED HEALTH CARE EDUCATION/TRAINING PROGRAM

## 2025-01-31 PROCEDURE — 83036 HEMOGLOBIN GLYCOSYLATED A1C: CPT | Performed by: STUDENT IN AN ORGANIZED HEALTH CARE EDUCATION/TRAINING PROGRAM

## 2025-01-31 PROCEDURE — 99999PBSHW PR PBB SHADOW TECHNICAL ONLY FILED TO HB: Mod: PBBFAC,,,

## 2025-01-31 PROCEDURE — 84466 ASSAY OF TRANSFERRIN: CPT | Performed by: STUDENT IN AN ORGANIZED HEALTH CARE EDUCATION/TRAINING PROGRAM

## 2025-01-31 PROCEDURE — 82728 ASSAY OF FERRITIN: CPT | Performed by: STUDENT IN AN ORGANIZED HEALTH CARE EDUCATION/TRAINING PROGRAM

## 2025-01-31 PROCEDURE — 3074F SYST BP LT 130 MM HG: CPT | Mod: CPTII,,, | Performed by: STUDENT IN AN ORGANIZED HEALTH CARE EDUCATION/TRAINING PROGRAM

## 2025-01-31 PROCEDURE — 99214 OFFICE O/P EST MOD 30 MIN: CPT | Mod: S$PBB,,, | Performed by: STUDENT IN AN ORGANIZED HEALTH CARE EDUCATION/TRAINING PROGRAM

## 2025-01-31 PROCEDURE — 96372 THER/PROPH/DIAG INJ SC/IM: CPT | Mod: PBBFAC,PN

## 2025-01-31 PROCEDURE — 1160F RVW MEDS BY RX/DR IN RCRD: CPT | Mod: CPTII,,, | Performed by: STUDENT IN AN ORGANIZED HEALTH CARE EDUCATION/TRAINING PROGRAM

## 2025-01-31 PROCEDURE — 82652 VIT D 1 25-DIHYDROXY: CPT | Performed by: STUDENT IN AN ORGANIZED HEALTH CARE EDUCATION/TRAINING PROGRAM

## 2025-01-31 PROCEDURE — 3044F HG A1C LEVEL LT 7.0%: CPT | Mod: CPTII,,, | Performed by: STUDENT IN AN ORGANIZED HEALTH CARE EDUCATION/TRAINING PROGRAM

## 2025-01-31 RX ORDER — LIDOCAINE HYDROCHLORIDE 20 MG/ML
1 INJECTION, SOLUTION EPIDURAL; INFILTRATION; INTRACAUDAL; PERINEURAL
Status: COMPLETED | OUTPATIENT
Start: 2025-01-31 | End: 2025-01-31

## 2025-01-31 RX ORDER — TRIAMCINOLONE ACETONIDE 40 MG/ML
40 INJECTION, SUSPENSION INTRA-ARTICULAR; INTRAMUSCULAR
Status: COMPLETED | OUTPATIENT
Start: 2025-01-31 | End: 2025-01-31

## 2025-01-31 RX ADMIN — LIDOCAINE HYDROCHLORIDE 20 MG: 20 INJECTION, SOLUTION EPIDURAL; INFILTRATION; INTRACAUDAL; PERINEURAL at 03:01

## 2025-01-31 RX ADMIN — TRIAMCINOLONE ACETONIDE 40 MG: 40 INJECTION, SUSPENSION INTRA-ARTICULAR; INTRAMUSCULAR at 03:01

## 2025-01-31 NOTE — PROGRESS NOTES
SUBJECTIVE     Chief Complaint   Patient presents with    Memory Loss    Shoulder Pain     Pt stated she has been having shoulder pain for awhile now and would like an injection for the pain.       History of Present Illness    CHIEF COMPLAINT:  Patient presents today for memory concerns.    COGNITIVE FUNCTION:  She reports progressive memory issues that became notably severe approximately 1.5-2 months ago. She experiences difficulty remembering appointments despite phone reminders, struggles with expressing thoughts and converting mental concepts into verbal communication, and frequently forgets minor details that only return to memory significantly later. She reports confusion and general forgetfulness in daily activities.    PSYCHIATRIC MEDICATIONS:  She currently takes Abilify, Lamictal, and gabapentin. She has been on Lamictal for approximately 3 years and gabapentin for about 5 years. She previously discontinued Abilify for a few months due to experiencing bad dreams while on the injectable form, but later resumed taking it in pill form, noting it helps stabilize her mood.    SLEEP:  She goes to bed around 10:30 PM and wakes up approximately 3 times per night for about five minutes each time, typically turning over and falling back asleep quickly. She generally feels well-rested in the morning but sometimes experiences grogginess and delayed wake-up. A sleep apnea test in 2009 was normal, and she denies any partner noticing breathing cessations during sleep.    ANXIETY:  She reports experiencing anxiety-related breathing issues for the past few years. When feeling nervous or anxious, she tends to hold her breath for extended periods until she can no longer sustain it, then repeats the pattern. She is concerned this behavior may be affecting her oxygen intake and potentially impacting her cognitive function.    MUSCULOSKELETAL:  She currently experiences left shoulder pain, particularly while driving. She has a  history of severe right shoulder inflammation 5-6 years ago that persisted for 30 days. A previous cortisone injection in the right shoulder administered by primary care physician in [place] resolved symptoms within two weeks.    SOCIAL HISTORY:  She reports alcohol consumption a couple times per year and currently smokes one pack of cigarettes per day. She denies marijuana use or other illicit drug use.    MEDICATIONS:  She also takes Tums and Ozempic. With Ozempic injections, she experiences extreme fluctuations in appetite, alternating between severe hunger and complete loss of appetite.    GYNECOLOGIC:  She reports her last menstrual cycle occurred approximately one year ago or more, indicating she is currently going through menopause.    JOINT INJECTION  PRE-OP DIAGNOSIS: L shoulder osteoarthritis  POST-OP DIAGNOSIS: Same  PROCEDURE: Joint injection  Performing Physician: Dr. Joanne Glaser      Dose:  2 ml  2% Lidocaine withOUT epinephrine             1 ml Kenalog 40mg/1mL         Informed Consent and Counseling: The procedure, alternative treatment options, risks, and benefits were thoroughly explained to the patient and informed verbal consent was obtained.  Appropriate equipment and medications were set up.    PROCEDURE:  The appropriate timeout was taken. We identified and marked the appropriate anatomic landmarks to guide needle placement.  The area was prepped in the usual sterile fashion and the overlying skin cleaned using alcohol wipes  2 ml of Lidocaine 2% without epinephrine and 1 ml of Kenalog 40mg/1mL in a needle of appropriate length and gauge was injected into the joint. Gentle aspiration before injection didn't show any blood.    Estimated blood loss was less than 0.5 cc.    A dressing was applied to the area. Anticipatory guidance, as well as standard post-procedure care, was explained. Return precautions were given. The patient tolerated the procedure well without complications. Follow-up  visit scheduled.         ROS:  General: -fever, -chills, -fatigue, -weight gain, -weight loss, +appetite changes  Eyes: -vision changes, -redness, -discharge  ENT: -ear pain, -nasal congestion, -sore throat  Cardiovascular: -chest pain, -palpitations, -lower extremity edema  Respiratory: -cough, -shortness of breath  Gastrointestinal: -abdominal pain, -nausea, -vomiting, -diarrhea, -constipation, -blood in stool  Genitourinary: -dysuria, -hematuria, -frequency  Musculoskeletal: +joint pain, -muscle pain  Skin: -rash, -lesion  Neurological: -headache, -dizziness, -numbness, -tingling  Psychiatric: +anxiety, -depression, -sleep difficulty, +memory problems, +forgetfulness               PAST MEDICAL HISTORY:  Past Medical History:   Diagnosis Date    Anxiety     Bipolar disorder     Depression     Restless leg syndrome        ALLERGIES AND MEDICATIONS: updated and reviewed.  Review of patient's allergies indicates:  No Known Allergies  Current Outpatient Medications   Medication Sig Dispense Refill    ARIPiprazole (ABILIFY) 5 MG Tab Take 1 tablet (5 mg total) by mouth once daily. 30 tablet 0    cetirizine (ZYRTEC) 10 MG tablet Take 1 tablet (10 mg total) by mouth once daily. 30 tablet 1    fluticasone propionate (FLONASE) 50 mcg/actuation nasal spray 1 spray (50 mcg total) by Each Nostril route once daily. 11.1 mL 1    gabapentin (NEURONTIN) 600 MG tablet Take 1 tablet (600 mg total) by mouth 2 (two) times daily as needed (leg pain and restless leg). 30 tablet 0    lamoTRIgine (LAMICTAL) 100 MG tablet Take 1 tablet (100 mg total) by mouth 2 (two) times daily. 60 tablet 0    loratadine (CLARITIN) 10 mg tablet Take 1 tablet (10 mg total) by mouth once daily. 30 tablet 2    ondansetron (ZOFRAN) 8 MG tablet Take 1 tablet by mouth 2 (two) times daily as needed.      pantoprazole (PROTONIX) 40 MG tablet Take 1 tablet (40 mg total) by mouth once daily. 30 tablet 2    semaglutide, weight loss, 0.5 mg/0.5 mL PnIj Inject 0.5 mg  "into the skin every 7 days.      albuterol (PROVENTIL/VENTOLIN HFA) 90 mcg/actuation inhaler Inhale 2 puffs into the lungs as needed for Wheezing. Rescue. 18 g 0     No current facility-administered medications for this visit.         OBJECTIVE     Physical Exam  Vitals:    01/31/25 1356   BP: (!) 110/92   Pulse: 78   Resp: 18   Temp: 97.6 °F (36.4 °C)    Body mass index is 22.54 kg/m².  Weight: 61.4 kg (135 lb 7.6 oz)   Height: 5' 5" (165.1 cm)     Physical Exam  Vitals reviewed.   Constitutional:       General: She is not in acute distress.  HENT:      Right Ear: External ear normal.      Left Ear: External ear normal.      Nose: Nose normal.      Mouth/Throat:      Mouth: Mucous membranes are moist.   Eyes:      Extraocular Movements: Extraocular movements intact.      Conjunctiva/sclera: Conjunctivae normal.      Pupils: Pupils are equal, round, and reactive to light.   Pulmonary:      Effort: Pulmonary effort is normal.   Abdominal:      General: There is no distension.      Palpations: Abdomen is soft.   Musculoskeletal:         General: No swelling. Normal range of motion.      Cervical back: Normal range of motion.   Skin:     General: Skin is warm and dry.      Findings: No rash.   Neurological:      General: No focal deficit present.      Mental Status: She is alert and oriented to person, place, and time.   Psychiatric:         Mood and Affect: Mood normal.         Behavior: Behavior normal.           Health Maintenance         Date Due Completion Date    Cervical Cancer Screening Never done ---    HIV Screening Never done ---    Pneumococcal Vaccines (Age 50+) (1 of 2 - PCV) Never done ---    Colorectal Cancer Screening Never done ---    Mammogram 08/29/2019 8/29/2018    Shingles Vaccine (1 of 2) Never done ---    Influenza Vaccine (1) Never done ---    COVID-19 Vaccine (1 - 2024-25 season) Never done ---    Lipid Panel 01/31/2030 1/31/2025    TETANUS VACCINE 06/12/2031 6/12/2021    RSV Vaccine (Age 60+ " and Pregnant patients) (1 - 1-dose 75+ series) 01/24/2047 ---              ASSESSMENT     55 y.o. female with     1. Memory loss    2. Bipolar 2 disorder    3. Gastroesophageal reflux disease, unspecified whether esophagitis present    4. Sleep apnea, unspecified type    5. Chronic left shoulder pain    6. Encounter for screening mammogram for malignant neoplasm of breast    7. Screening for malignant neoplasm of colon    8. Personal history of nicotine dependence      - Assessed memory issues, considering likely causes:  -  Sleep disturbances  -  Vitamin deficiencies  -  Medication side effects (deemed unlikely with current regimen)  - Reviewed prior brain imaging from 2022, results normal  - Assessed for sleep apnea risk factors  - Evaluated left shoulder pain, decided to administer cortisone injection  PLAN:     1. Memory loss  - Emphasized the importance of sleep in memory consolidation.  - Discussed potential impact of breath-holding behavior on oxygenation and cognitive function.  - Noted patient reports significant memory issues and confusion, forgetting appointments and misplacing items, starting noticeably about 1.5-2 months ago.  - Observed patient's difficulty in expressing thoughts.  - Reviewed previous brain imaging from 2022, which showed no abnormalities.  - Ordered labs to check vitamin levels as potential causes of cognitive issues.  - Hemoglobin A1C; Future  - Lipid Panel; Future  - TSH; Future  - Comprehensive Metabolic Panel; Future  - CBC Auto Differential; Future  - VITAMIN B12; Future  - Folate; Future  - Methylmalonic Acid, Serum; Future  - Magnesium; Future  - Iron and TIBC; Future  - Ferritin; Future  - Folate; Future  - Calcitriol; Future    2. Bipolar 2 disorder  - Continued Abilify at current dose for mood stabilization.  - Continued Lamictal at current dose for mood stabilization.  - Instructed the patient to monitor and report any changes in mood or side effects.  - Reviewed medication  history, noting patient had stopped Abilify for a few months due to bad dreams.  - Hemoglobin A1C; Future  - Lipid Panel; Future  - TSH; Future  - Comprehensive Metabolic Panel; Future  - CBC Auto Differential; Future  - VITAMIN B12; Future  - Folate; Future  - Methylmalonic Acid, Serum; Future  - Magnesium; Future  - Iron and TIBC; Future  - Ferritin; Future  - Folate; Future  - Calcitriol; Future    3. Gastroesophageal reflux disease, unspecified whether esophagitis present  - Continued current medication for heartburn management.  - Instructed patient to report any worsening of symptoms.  - Hemoglobin A1C; Future  - Lipid Panel; Future  - TSH; Future  - Comprehensive Metabolic Panel; Future  - CBC Auto Differential; Future  - VITAMIN B12; Future  - Folate; Future  - Methylmalonic Acid, Serum; Future  - Magnesium; Future  - Iron and TIBC; Future  - Ferritin; Future  - Folate; Future  - Calcitriol; Future    4. Sleep apnea, unspecified type  - Recommend a sleep study to assess sleep quality.  - Ordered a sleep study to evaluate sleep patterns.  - Noted patient reports waking up 2-3 times per night, feeling groggy upon waking, and staying awake for about 5 minutes during night awakenings.  - Inquired about previous sleep apnea test results.  - Advised patient to maintain a sleep diary until next appointment.  - Ambulatory referral/consult to Sleep Disorders; Future  - Calcitriol; Future    5. Chronic left shoulder pain  - Administered cortisone injection to left shoulder for pain relief.  - Noted history of right shoulder inflammation 5-6 years ago.  - Assessed current left shoulder pain, especially while driving.  - Instructed patient to monitor and report any changes in shoulder pain or mobility.  - Noted patient's past use of tramadol for shoulder pain.  - Assessed current pain management needs.  - Discussed alternative pain management strategies if needed.  - triamcinolone acetonide injection 40 mg  - LIDOcaine  (PF) 20 mg/ml (2%) injection 20 mg    6. Encounter for screening mammogram for malignant neoplasm of breast  - Due, ordered.  - Mammo Digital Screening Bilat w/ Matthias; Future    7. Screening for malignant neoplasm of colon  - Due, ordered.  - Ambulatory referral/consult to Endo Procedure ; Future    8. Personal history of nicotine dependence  - Advised patient to reduce smoking from current level of approximately 1 pack per day.  - Discussed smoking cessation strategies and resources.  - Recommend follow-up to assess progress in smoking reduction.  - Discussed health risks associated with continued tobacco use.  - Offered resources for smoking cessation support.  - Patient to reduce smoking (currently about 1 pack a day).  - CT Chest Lung Screening Low Dose; Future      ALLERGIES:  - Continued current allergy medication to be taken as needed.  - Advised patient to monitor effectiveness of the medication.    ASTHMA:  - Continued current inhaler to be used as needed.  - Instructed patient to report any increase in frequency of inhaler use.    ANXIETY:  - Continued gabapentin at current dose for anxiety management.  - Noted patient reports holding breath when anxious.  - Discussed breathing techniques to manage anxiety symptoms.    WEIGHT MANAGEMENT:  - Continued Ozempic injections.    LABS:  - Comprehensive labs including vitamin levels ordered.    FOLLOW UP:  - Scheduled follow-up visit for March 14th to review results.  - Follow up on March 14th at 1:00 PM to review labs and sleep study results.       Joanne Glaser MD  02/03/2025 2:19 PM      This note was generated with the assistance of ambient listening technology. Verbal consent was obtained by the patient and accompanying visitor(s) for the recording of patient appointment to facilitate this note. I attest to having reviewed and edited the generated note for accuracy, though some syntax or spelling errors may persist. Please contact the author of this  note for any clarification.

## 2025-02-01 LAB
ESTIMATED AVG GLUCOSE: 91 MG/DL (ref 68–131)
FOLATE SERPL-MCNC: 6.8 NG/ML (ref 4–24)
FOLATE SERPL-MCNC: 6.8 NG/ML (ref 4–24)
HBA1C MFR BLD: 4.8 % (ref 4–5.6)
VIT B12 SERPL-MCNC: 716 PG/ML (ref 210–950)

## 2025-02-04 ENCOUNTER — CLINICAL SUPPORT (OUTPATIENT)
Dept: SMOKING CESSATION | Facility: CLINIC | Age: 55
End: 2025-02-04
Payer: COMMERCIAL

## 2025-02-04 DIAGNOSIS — F17.200 NICOTINE DEPENDENCE: Primary | ICD-10-CM

## 2025-02-04 PROCEDURE — 99404 PREV MED CNSL INDIV APPRX 60: CPT | Mod: S$GLB,,,

## 2025-02-04 RX ORDER — MICONAZOLE NITRATE 2 %
2 CREAM (GRAM) TOPICAL
Qty: 120 EACH | Refills: 0 | Status: SHIPPED | OUTPATIENT
Start: 2025-02-04 | End: 2025-02-18 | Stop reason: HOSPADM

## 2025-02-04 RX ORDER — IBUPROFEN 200 MG
1 TABLET ORAL DAILY
Qty: 14 PATCH | Refills: 0 | Status: SHIPPED | OUTPATIENT
Start: 2025-02-04 | End: 2025-02-18 | Stop reason: HOSPADM

## 2025-02-04 NOTE — Clinical Note
Patient was seen in clinic today for intake. Patient smokes 1 ppd.  Patient's CO monitor was  29  ppm.  Patient will begin the prescribed tobacco cessation medication regimen of   21 mg nicotine patch QD and 2 mg nicotine gums PRN.   Completion of TCRS (Tobacco Cessation Rating Scale) reviewed learned addiction, model, personal reasons for quitting, medications and goals.  Prescribed medication management will be by physician.  Patient will continue with sessions and medication monitoring by CTTS.

## 2025-02-05 ENCOUNTER — TELEPHONE (OUTPATIENT)
Dept: ENDOSCOPY | Facility: HOSPITAL | Age: 55
End: 2025-02-05
Payer: MEDICAID

## 2025-02-05 VITALS — BODY MASS INDEX: 22.49 KG/M2 | WEIGHT: 135 LBS | HEIGHT: 65 IN

## 2025-02-05 DIAGNOSIS — Z12.11 SPECIAL SCREENING FOR MALIGNANT NEOPLASMS, COLON: Primary | ICD-10-CM

## 2025-02-05 LAB — 1,25(OH)2D3 SERPL-MCNC: 47 PG/ML (ref 20–79)

## 2025-02-05 RX ORDER — SODIUM, POTASSIUM,MAG SULFATES 17.5-3.13G
1 SOLUTION, RECONSTITUTED, ORAL ORAL DAILY
Qty: 1 KIT | Refills: 0 | Status: SHIPPED | OUTPATIENT
Start: 2025-02-05 | End: 2025-02-07

## 2025-02-05 NOTE — TELEPHONE ENCOUNTER
Referral for procedure from telephone call workqueue      Spoke to patient to schedule procedure(s) Colonoscopy       Physician to perform procedure(s) Dr. ELISA Salamanca  Date of Procedure (s) 2/12/25  Arrival Time 12:30 PM  Time of Procedure(s) 1:30 PM   Location of Procedure(s) [place] [address]  Type of Rx Prep sent to patient: Suprep  Instructions provided to patient via [facility]    Patient was informed on the following information and verbalized understanding. Screening questionnaire reviewed with patient and complete. If procedure requires anesthesia, a responsible adult needs to be present to accompany the patient home, patient cannot drive after receiving anesthesia. Appointment details are tentative, especially check-in time. Patient will receive a prep-op call 7 days prior to confirm check-in time for procedure. If applicable the patient should contact their pharmacy to verify Rx for procedure prep is ready for pick-up. Patient was advised to call the scheduling department at [phone] if pharmacy states no Rx is available. Patient was advised to call the endoscopy scheduling department if any questions or concerns arise.      [facility] Scheduling Department

## 2025-02-06 LAB — METHYLMALONATE SERPL-SCNC: 0.2 UMOL/L

## 2025-02-10 ENCOUNTER — HOSPITAL ENCOUNTER (OUTPATIENT)
Dept: RADIOLOGY | Facility: HOSPITAL | Age: 55
Discharge: HOME | End: 2025-02-10
Payer: MEDICAID

## 2025-02-10 ENCOUNTER — HOSPITAL ENCOUNTER (OUTPATIENT)
Dept: RADIOLOGY | Facility: HOSPITAL | Age: 55
Discharge: HOME | End: 2025-02-10
Attending: STUDENT IN AN ORGANIZED HEALTH CARE EDUCATION/TRAINING PROGRAM
Payer: MEDICAID

## 2025-02-10 DIAGNOSIS — Z12.39 ENCOUNTER FOR SCREENING FOR MALIGNANT NEOPLASM OF BREAST, UNSPECIFIED SCREENING MODALITY: ICD-10-CM

## 2025-02-10 DIAGNOSIS — Z87.891 PERSONAL HISTORY OF NICOTINE DEPENDENCE: ICD-10-CM

## 2025-02-10 PROCEDURE — 71271 CT THORAX LUNG CANCER SCR C-: CPT | Mod: 26,,, | Performed by: RADIOLOGY

## 2025-02-10 PROCEDURE — 77067 SCR MAMMO BI INCL CAD: CPT | Mod: 26,,, | Performed by: RADIOLOGY

## 2025-02-10 PROCEDURE — 77063 BREAST TOMOSYNTHESIS BI: CPT | Mod: 26,,, | Performed by: RADIOLOGY

## 2025-02-10 PROCEDURE — 71271 CT THORAX LUNG CANCER SCR C-: CPT | Mod: TC

## 2025-02-10 PROCEDURE — 77067 SCR MAMMO BI INCL CAD: CPT | Mod: TC

## 2025-02-11 ENCOUNTER — TELEPHONE (OUTPATIENT)
Dept: ENDOSCOPY | Facility: HOSPITAL | Age: 55
End: 2025-02-11

## 2025-02-11 NOTE — TELEPHONE ENCOUNTER
Patient confirmed 0915 arrival for scheduled procedure for tomorrow. Reviewed all preop instructions. Patient confirmed ride home.

## 2025-02-15 ENCOUNTER — TELEPHONE (OUTPATIENT)
Dept: ENDOSCOPY | Facility: HOSPITAL | Age: 55
End: 2025-02-15
Payer: MEDICAID

## 2025-02-15 NOTE — TELEPHONE ENCOUNTER
----- Message from Birdie sent at 2/6/2025  8:40 AM CST -----  Regarding: FW: Appointment Access    ----- Message -----  From: Geovanna Mello  Sent: 2/5/2025  10:37 AM CST  To: [facility] Schedulers  Subject: Appointment Access                               Please call pt. @[phone] to schedule an appt.    Referral in chart.    DANIELA Mello

## 2025-02-18 ENCOUNTER — CLINICAL SUPPORT (OUTPATIENT)
Dept: SMOKING CESSATION | Facility: CLINIC | Age: 55
End: 2025-02-18
Payer: COMMERCIAL

## 2025-02-18 DIAGNOSIS — F17.200 NICOTINE DEPENDENCE: Primary | ICD-10-CM

## 2025-02-18 NOTE — PROGRESS NOTES
Called to remind patient about her follow up appointment.  Patient states that she rate fade to 2-3 cpd.  Commended patient for the accomplishment thus far. Patient states that her PCP prescribed her Chantix and she is no longer interested to return to the program.  Patient also states that she can quit on her own.   We reviewed coping with urges/cravings to smoke and motivation to quit.   Informed patient about future help or support if needed.  Contact information was given.

## 2025-02-20 ENCOUNTER — OFFICE VISIT (OUTPATIENT)
Facility: CLINIC | Age: 55
End: 2025-02-20
Payer: MEDICAID

## 2025-02-20 ENCOUNTER — LAB VISIT (OUTPATIENT)
Dept: LAB | Facility: HOSPITAL | Age: 55
End: 2025-02-20
Attending: STUDENT IN AN ORGANIZED HEALTH CARE EDUCATION/TRAINING PROGRAM
Payer: MEDICAID

## 2025-02-20 VITALS
BODY MASS INDEX: 22.22 KG/M2 | WEIGHT: 133.38 LBS | HEART RATE: 87 BPM | SYSTOLIC BLOOD PRESSURE: 102 MMHG | RESPIRATION RATE: 18 BRPM | TEMPERATURE: 98 F | DIASTOLIC BLOOD PRESSURE: 72 MMHG | OXYGEN SATURATION: 97 % | HEIGHT: 65 IN

## 2025-02-20 DIAGNOSIS — Z12.4 CERVICAL CANCER SCREENING: ICD-10-CM

## 2025-02-20 DIAGNOSIS — Z11.3 ROUTINE SCREENING FOR STI (SEXUALLY TRANSMITTED INFECTION): ICD-10-CM

## 2025-02-20 DIAGNOSIS — F31.81 BIPOLAR 2 DISORDER: ICD-10-CM

## 2025-02-20 DIAGNOSIS — Z11.3 ROUTINE SCREENING FOR STI (SEXUALLY TRANSMITTED INFECTION): Primary | ICD-10-CM

## 2025-02-20 PROCEDURE — 99214 OFFICE O/P EST MOD 30 MIN: CPT | Mod: PBBFAC,PN | Performed by: STUDENT IN AN ORGANIZED HEALTH CARE EDUCATION/TRAINING PROGRAM

## 2025-02-20 PROCEDURE — 87340 HEPATITIS B SURFACE AG IA: CPT | Performed by: STUDENT IN AN ORGANIZED HEALTH CARE EDUCATION/TRAINING PROGRAM

## 2025-02-20 PROCEDURE — 86593 SYPHILIS TEST NON-TREP QUANT: CPT | Performed by: STUDENT IN AN ORGANIZED HEALTH CARE EDUCATION/TRAINING PROGRAM

## 2025-02-20 PROCEDURE — 88175 CYTOPATH C/V AUTO FLUID REDO: CPT | Performed by: STUDENT IN AN ORGANIZED HEALTH CARE EDUCATION/TRAINING PROGRAM

## 2025-02-20 PROCEDURE — 87624 HPV HI-RISK TYP POOLED RSLT: CPT | Performed by: STUDENT IN AN ORGANIZED HEALTH CARE EDUCATION/TRAINING PROGRAM

## 2025-02-20 PROCEDURE — 36415 COLL VENOUS BLD VENIPUNCTURE: CPT | Performed by: STUDENT IN AN ORGANIZED HEALTH CARE EDUCATION/TRAINING PROGRAM

## 2025-02-20 PROCEDURE — 86803 HEPATITIS C AB TEST: CPT | Performed by: STUDENT IN AN ORGANIZED HEALTH CARE EDUCATION/TRAINING PROGRAM

## 2025-02-20 PROCEDURE — 99999 PR PBB SHADOW E&M-EST. PATIENT-LVL IV: CPT | Mod: PBBFAC,,, | Performed by: STUDENT IN AN ORGANIZED HEALTH CARE EDUCATION/TRAINING PROGRAM

## 2025-02-20 PROCEDURE — 86592 SYPHILIS TEST NON-TREP QUAL: CPT | Performed by: STUDENT IN AN ORGANIZED HEALTH CARE EDUCATION/TRAINING PROGRAM

## 2025-02-20 PROCEDURE — 87389 HIV-1 AG W/HIV-1&-2 AB AG IA: CPT | Performed by: STUDENT IN AN ORGANIZED HEALTH CARE EDUCATION/TRAINING PROGRAM

## 2025-02-20 PROCEDURE — 86780 TREPONEMA PALLIDUM: CPT | Performed by: STUDENT IN AN ORGANIZED HEALTH CARE EDUCATION/TRAINING PROGRAM

## 2025-02-20 RX ORDER — VARENICLINE TARTRATE 0.5 (11)-1
KIT ORAL
COMMUNITY
Start: 2025-02-05

## 2025-02-20 RX ORDER — FAMOTIDINE 40 MG/1
40 TABLET, FILM COATED ORAL NIGHTLY PRN
COMMUNITY
Start: 2025-02-05

## 2025-02-20 NOTE — PROGRESS NOTES
"SUBJECTIVE     Chief Complaint   Patient presents with    Gynecologic Exam       History of Present Illness    CHIEF COMPLAINT:  Patient presents today for annual pap smear    GYNECOLOGIC HISTORY:  She experienced spotting two weeks ago lasting three days with menarche at age 12-13. She has had three total pregnancies with two living children. She reports occasional sexual activity with /ex- and has had tubal ligation for contraception.    RECENT IMAGING:  CT and mammogram results were normal.    PSYCHIATRIC CARE:  She recently required emergency room visit due to inability to obtain medication refills. She has initiated care with a psychiatrist through the [facility] program to assist with medication management.         PAST MEDICAL HISTORY:  Past Medical History:   Diagnosis Date    Anxiety     Bipolar disorder     Depression     Restless leg syndrome        ALLERGIES AND MEDICATIONS: updated and reviewed.  Review of patient's allergies indicates:  No Known Allergies  Current Medications[1]      OBJECTIVE     Physical Exam  Vitals:    02/20/25 1432   BP: 102/72   Pulse: 87   Resp: 18   Temp: 97.6 °F (36.4 °C)    Body mass index is 22.2 kg/m².  Weight: 60.5 kg (133 lb 6.1 oz)   Height: 5' 5" (165.1 cm)     Physical Exam  Vitals reviewed.   Constitutional:       General: She is not in acute distress.  HENT:      Right Ear: External ear normal.      Left Ear: External ear normal.      Nose: Nose normal.      Mouth/Throat:      Mouth: Mucous membranes are moist.   Eyes:      Extraocular Movements: Extraocular movements intact.      Conjunctiva/sclera: Conjunctivae normal.      Pupils: Pupils are equal, round, and reactive to light.   Pulmonary:      Effort: Pulmonary effort is normal.   Abdominal:      General: There is no distension.      Palpations: Abdomen is soft.   Musculoskeletal:         General: No swelling. Normal range of motion.      Cervical back: Normal range of motion.   Skin:     General: Skin " is warm and dry.      Findings: No rash.   Neurological:      General: No focal deficit present.      Mental Status: She is alert and oriented to person, place, and time.   Psychiatric:         Mood and Affect: Mood normal.         Behavior: Behavior normal.           Health Maintenance         Date Due Completion Date    Pneumococcal Vaccines (Age 50+) (1 of 2 - PCV) Never done ---    Colorectal Cancer Screening Never done ---    Shingles Vaccine (1 of 2) Never done ---    Influenza Vaccine (1) Never done ---    COVID-19 Vaccine (1 - 2024-25 season) Never done ---    LDCT Lung Screen 02/10/2026 2/10/2025    Mammogram 02/10/2026 2/10/2025    Lipid Panel 01/31/2030 1/31/2025    Cervical Cancer Screening 02/20/2030 2/20/2025    TETANUS VACCINE 06/12/2031 6/12/2021    RSV Vaccine (Age 60+ and Pregnant patients) (1 - 1-dose 75+ series) 01/24/2047 ---              ASSESSMENT     55 y.o. female with     1. Routine screening for STI (sexually transmitted infection)    2. Bipolar 2 disorder    3. Cervical cancer screening      - Performed pap smear and STD testing  - Recommend Cologuard test as an alternative to colonoscopy based on patient's preference  - Reviewed recent CT and mammogram results, both normal  - Noted patient's participation in [facility] program for medication management  - Assessed menstrual status, considering possible transition to menopause  PLAN:     1. Bipolar 2 disorder  - Stable, continue current regimen.     2. Routine screening for STI (sexually transmitted infection)  - Due, ordered.  - Hepatitis B Surface Antigen; Future  - C. trachomatis/N. gonorrhoeae by AMP DNA Ochrossi; Urine; Future  - Hepatitis C Antibody; Future  - HIV 1/2 Ag/Ab (4th Gen); Future  - Treponema Pallidium Antibodies IgG, IgM; Future    3. Cervical cancer screening  - Explained new Pap smear guidelines: normal results are now valid for 5 years.  - Discussed potential graduation from regular Pap smears after 1-2 more tests.  -  Performed Pap smear and sent it for HPV testing.    MENSTRUAL CYCLE MONITORING:  - Patient reported a recent menstrual cycle lasting approximately 3 days with light flow.  - Evaluated the reported menstrual cycle as likely spotting.  - Instructed the patient to monitor and report any changes in menstrual patterns.    REPRODUCTIVE HEALTH:  - Confirmed the patient's history of tubal ligation.  - Noted the patient's obstetric history of 3 pregnancies resulting in 2 children.  - Advised the patient to report any unusual symptoms related to her reproductive health.           Joanne Glaser MD  03/14/2025 2:34 PM        Follow up in about 6 months (around 8/20/2025).    This note was generated with the assistance of ambient listening technology. Verbal consent was obtained by the patient and accompanying visitor(s) for the recording of patient appointment to facilitate this note. I attest to having reviewed and edited the generated note for accuracy, though some syntax or spelling errors may persist. Please contact the author of this note for any clarification.                     [1]   Current Outpatient Medications   Medication Sig Dispense Refill    ARIPiprazole (ABILIFY) 5 MG Tab Take 1 tablet (5 mg total) by mouth once daily. 30 tablet 0    famotidine (PEPCID) 40 MG tablet Take 40 mg by mouth nightly as needed for Heartburn.      fluticasone propionate (FLONASE) 50 mcg/actuation nasal spray 1 spray (50 mcg total) by Each Nostril route once daily. 11.1 mL 1    gabapentin (NEURONTIN) 600 MG tablet Take 1 tablet (600 mg total) by mouth 2 (two) times daily as needed (leg pain and restless leg). 30 tablet 0    lamoTRIgine (LAMICTAL) 100 MG tablet Take 1 tablet (100 mg total) by mouth 2 (two) times daily. 60 tablet 0    loratadine (CLARITIN) 10 mg tablet Take 1 tablet (10 mg total) by mouth once daily. 30 tablet 2    ondansetron (ZOFRAN) 8 MG tablet Take 1 tablet by mouth 2 (two) times daily as needed.      pantoprazole  (PROTONIX) 40 MG tablet Take 1 tablet (40 mg total) by mouth once daily. 30 tablet 2    semaglutide, weight loss, 0.5 mg/0.5 mL PnIj Inject 0.5 mg into the skin every 7 days.      varenicline tartrate (CHANTIX PHAM) 0.5 mg (11)- 1 mg (42) tablet Take by mouth.      albuterol (PROVENTIL/VENTOLIN HFA) 90 mcg/actuation inhaler Inhale 2 puffs into the lungs as needed for Wheezing. Rescue. 18 g 0    cetirizine (ZYRTEC) 10 MG tablet Take 1 tablet (10 mg total) by mouth once daily. (Patient not taking: Reported on 2/20/2025) 30 tablet 1     No current facility-administered medications for this visit.

## 2025-02-21 LAB
HBV SURFACE AG SERPL QL IA: NORMAL
HCV AB SERPL QL IA: NORMAL
HIV 1+2 AB+HIV1 P24 AG SERPL QL IA: NORMAL
TREPONEMA PALLIDUM IGG+IGM AB [PRESENCE] IN SERUM OR PLASMA BY IMMUNOASSAY: REACTIVE

## 2025-02-22 LAB — RPR SER QL: NORMAL

## 2025-02-24 LAB — T PALLIDUM AB SER QL IF: REACTIVE

## 2025-02-25 ENCOUNTER — PATIENT MESSAGE (OUTPATIENT)
Facility: CLINIC | Age: 55
End: 2025-02-25
Payer: MEDICAID

## 2025-02-25 ENCOUNTER — RESULTS FOLLOW-UP (OUTPATIENT)
Facility: CLINIC | Age: 55
End: 2025-02-25

## 2025-02-27 LAB
CLINICAL INFO: NORMAL
CYTO CVX: NORMAL
CYTOLOGIST CVX/VAG CYTO: NORMAL
CYTOLOGIST CVX/VAG CYTO: NORMAL
CYTOLOGY CMNT CVX/VAG CYTO-IMP: NORMAL
CYTOLOGY PAP THIN PREP EXPLANATION: NORMAL
DATE OF PREVIOUS PAP: NORMAL
DATE PREVIOUS BX: NORMAL
GEN CATEG CVX/VAG CYTO-IMP: NORMAL
HPV I/H RISK 4 DNA CVX QL NAA+PROBE: NOT DETECTED
LMP START DATE: NORMAL
MICROORGANISM CVX/VAG CYTO: NORMAL
PATHOLOGIST CVX/VAG CYTO: NORMAL
SERVICE CMNT-IMP: NORMAL
SPECIMEN SOURCE CVX/VAG CYTO: NORMAL
STAT OF ADQ CVX/VAG CYTO-IMP: NORMAL

## 2025-05-05 ENCOUNTER — TELEPHONE (OUTPATIENT)
Dept: SMOKING CESSATION | Facility: CLINIC | Age: 55
End: 2025-05-05
Payer: MEDICAID

## 2025-05-28 ENCOUNTER — PATIENT OUTREACH (OUTPATIENT)
Dept: ADMINISTRATIVE | Facility: HOSPITAL | Age: 55
End: 2025-05-28
Payer: MEDICAID

## 2025-05-28 NOTE — PROGRESS NOTES
Health Maintenance Due   Topic Date Due    Pneumococcal Vaccines (Age 50+) (1 of 2 - PCV) Never done    Colorectal Cancer Screening  Never done    Shingles Vaccine (1 of 2) Never done    COVID-19 Vaccine (1 - 2024-25 season) Never done     L/M for rescheduling colorectal cancer screen  Number provided

## 2025-07-23 ENCOUNTER — TELEPHONE (OUTPATIENT)
Dept: SMOKING CESSATION | Facility: CLINIC | Age: 55
End: 2025-07-23
Payer: MEDICAID

## 2025-07-23 NOTE — TELEPHONE ENCOUNTER
1st attempt left message regarding smoking cessation quit 1 episode 6/12 month follow up.  Completed smart form.  Resolved episode.  Voice message includes name and contact information.

## 2025-08-05 ENCOUNTER — TELEPHONE (OUTPATIENT)
Dept: SMOKING CESSATION | Facility: CLINIC | Age: 55
End: 2025-08-05
Payer: MEDICAID

## 2025-08-21 ENCOUNTER — TELEPHONE (OUTPATIENT)
Dept: SMOKING CESSATION | Facility: CLINIC | Age: 55
End: 2025-08-21
Payer: MEDICAID